# Patient Record
Sex: MALE | Race: WHITE | NOT HISPANIC OR LATINO | URBAN - METROPOLITAN AREA
[De-identification: names, ages, dates, MRNs, and addresses within clinical notes are randomized per-mention and may not be internally consistent; named-entity substitution may affect disease eponyms.]

---

## 2017-01-25 ENCOUNTER — INPATIENT (INPATIENT)
Facility: HOSPITAL | Age: 82
LOS: 2 days | Discharge: HOME | DRG: 300 | End: 2017-01-28
Attending: HOSPITALIST | Admitting: HOSPITALIST
Payer: MEDICARE

## 2017-01-25 VITALS
HEART RATE: 98 BPM | RESPIRATION RATE: 18 BRPM | TEMPERATURE: 98 F | OXYGEN SATURATION: 96 % | SYSTOLIC BLOOD PRESSURE: 129 MMHG | DIASTOLIC BLOOD PRESSURE: 72 MMHG

## 2017-01-25 DIAGNOSIS — Z41.8 ENCOUNTER FOR OTHER PROCEDURES FOR PURPOSES OTHER THAN REMEDYING HEALTH STATE: ICD-10-CM

## 2017-01-25 DIAGNOSIS — Z95.2 PRESENCE OF PROSTHETIC HEART VALVE: ICD-10-CM
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
## 2017-01-25 DIAGNOSIS — R62.7 ADULT FAILURE TO THRIVE: ICD-10-CM

## 2017-01-25 DIAGNOSIS — L97.312 NON-PRESSURE CHRONIC ULCER OF RIGHT ANKLE WITH FAT LAYER EXPOSED: ICD-10-CM

## 2017-01-25 LAB
ALBUMIN SERPL ELPH-MCNC: 2.7 G/DL — LOW (ref 3.4–5)
ALP SERPL-CCNC: 91 U/L — SIGNIFICANT CHANGE UP (ref 40–120)
ALT FLD-CCNC: 14 U/L — SIGNIFICANT CHANGE UP (ref 12–42)
ANION GAP SERPL CALC-SCNC: 9 MMOL/L — SIGNIFICANT CHANGE UP (ref 9–16)
APPEARANCE UR: CLEAR — SIGNIFICANT CHANGE UP
AST SERPL-CCNC: 38 U/L — HIGH (ref 15–37)
BILIRUB SERPL-MCNC: 1.1 MG/DL — SIGNIFICANT CHANGE UP (ref 0.2–1.2)
BILIRUB UR-MCNC: NEGATIVE — SIGNIFICANT CHANGE UP
BUN SERPL-MCNC: 23 MG/DL — SIGNIFICANT CHANGE UP (ref 7–23)
CALCIUM SERPL-MCNC: 8.6 MG/DL — SIGNIFICANT CHANGE UP (ref 8.5–10.5)
CHLORIDE SERPL-SCNC: 100 MMOL/L — SIGNIFICANT CHANGE UP (ref 96–108)
CK SERPL-CCNC: 253 U/L — SIGNIFICANT CHANGE UP (ref 39–308)
CO2 SERPL-SCNC: 28 MMOL/L — SIGNIFICANT CHANGE UP (ref 22–31)
COLOR SPEC: YELLOW — SIGNIFICANT CHANGE UP
CREAT SERPL-MCNC: 1.03 MG/DL — SIGNIFICANT CHANGE UP (ref 0.5–1.3)
DIFF PNL FLD: (no result)
GLUCOSE SERPL-MCNC: 149 MG/DL — HIGH (ref 70–99)
GLUCOSE UR QL: NEGATIVE — SIGNIFICANT CHANGE UP
HCT VFR BLD CALC: 33.7 % — LOW (ref 39–50)
HGB BLD-MCNC: 11.7 G/DL — LOW (ref 13–17)
KETONES UR-MCNC: NEGATIVE — SIGNIFICANT CHANGE UP
LEUKOCYTE ESTERASE UR-ACNC: NEGATIVE — SIGNIFICANT CHANGE UP
MCHC RBC-ENTMCNC: 33.1 PG — SIGNIFICANT CHANGE UP (ref 27–34)
MCHC RBC-ENTMCNC: 34.7 G/DL — SIGNIFICANT CHANGE UP (ref 32–36)
MCV RBC AUTO: 95.5 FL — SIGNIFICANT CHANGE UP (ref 80–100)
NITRITE UR-MCNC: NEGATIVE — SIGNIFICANT CHANGE UP
PH UR: 6 — SIGNIFICANT CHANGE UP (ref 4–8)
PLATELET # BLD AUTO: 209 K/UL — SIGNIFICANT CHANGE UP (ref 150–400)
POTASSIUM SERPL-MCNC: 5 MMOL/L — SIGNIFICANT CHANGE UP (ref 3.5–5.3)
POTASSIUM SERPL-SCNC: 5 MMOL/L — SIGNIFICANT CHANGE UP (ref 3.5–5.3)
PROT SERPL-MCNC: 7.1 G/DL — SIGNIFICANT CHANGE UP (ref 6.4–8.2)
PROT UR-MCNC: (no result) MG/DL
RBC # BLD: 3.53 M/UL — LOW (ref 4.2–5.8)
RBC # FLD: 12.4 % — SIGNIFICANT CHANGE UP (ref 10.3–16.9)
SODIUM SERPL-SCNC: 137 MMOL/L — SIGNIFICANT CHANGE UP (ref 135–145)
SP GR SPEC: 1.01 — SIGNIFICANT CHANGE UP (ref 1–1.03)
UROBILINOGEN FLD QL: 0.2 E.U./DL — SIGNIFICANT CHANGE UP
WBC # BLD: 8.1 K/UL — SIGNIFICANT CHANGE UP (ref 3.8–10.5)
WBC # FLD AUTO: 8.1 K/UL — SIGNIFICANT CHANGE UP (ref 3.8–10.5)

## 2017-01-25 PROCEDURE — 99285 EMERGENCY DEPT VISIT HI MDM: CPT | Mod: 25

## 2017-01-25 PROCEDURE — 71010: CPT | Mod: 26

## 2017-01-25 PROCEDURE — 73610 X-RAY EXAM OF ANKLE: CPT | Mod: 26,RT

## 2017-01-25 PROCEDURE — 73600 X-RAY EXAM OF ANKLE: CPT | Mod: 26,RT

## 2017-01-25 PROCEDURE — 99223 1ST HOSP IP/OBS HIGH 75: CPT | Mod: GC

## 2017-01-25 PROCEDURE — 73620 X-RAY EXAM OF FOOT: CPT | Mod: 26,RT

## 2017-01-25 RX ORDER — SODIUM CHLORIDE 9 MG/ML
1000 INJECTION INTRAMUSCULAR; INTRAVENOUS; SUBCUTANEOUS
Qty: 0 | Refills: 0 | Status: DISCONTINUED | OUTPATIENT
Start: 2017-01-25 | End: 2017-01-25

## 2017-01-25 RX ORDER — VANCOMYCIN HCL 1 G
1000 VIAL (EA) INTRAVENOUS ONCE
Qty: 0 | Refills: 0 | Status: COMPLETED | OUTPATIENT
Start: 2017-01-25 | End: 2017-01-25

## 2017-01-25 RX ORDER — HEPARIN SODIUM 5000 [USP'U]/ML
5000 INJECTION INTRAVENOUS; SUBCUTANEOUS EVERY 8 HOURS
Qty: 0 | Refills: 0 | Status: DISCONTINUED | OUTPATIENT
Start: 2017-01-25 | End: 2017-01-28

## 2017-01-25 RX ORDER — PIPERACILLIN AND TAZOBACTAM 4; .5 G/20ML; G/20ML
3.38 INJECTION, POWDER, LYOPHILIZED, FOR SOLUTION INTRAVENOUS ONCE
Qty: 0 | Refills: 0 | Status: COMPLETED | OUTPATIENT
Start: 2017-01-25 | End: 2017-01-25

## 2017-01-25 RX ORDER — SODIUM CHLORIDE 9 MG/ML
1000 INJECTION INTRAMUSCULAR; INTRAVENOUS; SUBCUTANEOUS
Qty: 0 | Refills: 0 | Status: DISCONTINUED | OUTPATIENT
Start: 2017-01-25 | End: 2017-01-26

## 2017-01-25 RX ADMIN — PIPERACILLIN AND TAZOBACTAM 200 GRAM(S): 4; .5 INJECTION, POWDER, LYOPHILIZED, FOR SOLUTION INTRAVENOUS at 16:05

## 2017-01-25 RX ADMIN — Medication 250 MILLIGRAM(S): at 17:10
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
## 2017-01-25 NOTE — ED PROVIDER NOTE - OBJECTIVE STATEMENT
89y male h/o LLE cellulitis, heart valve replacement, has not seen MD in many years, brought in by EMS after building super found him on the floor in his apt. Pt states that he lost his balance while reaching for something today and fell. Denies hitting his head or LOC. EMS reported to staff that apt was very dirty. Pt denies any current complaints, but appears unkempt. 89y male h/o LLE cellulitis, heart valve replacement, has not seen MD in many years, brought in by EMS after building super found him on the floor in his apt. Pt states that he lost his balance while reaching for something today and fell. Denies hitting his head or LOC. EMS reported to staff that apt was very dirty. Pt denies any current complaints, but appears unkempt. States he has had an ulcer on his right ankle for 40 years.

## 2017-01-25 NOTE — ED PROVIDER NOTE - MUSCULOSKELETAL, MLM
no cspine bony ttp/stepoff. 5x6cm open wound over left medial malleolus with foul-odor and maggots present. significant swelling to left foot/ankle, warm and well-perfused.

## 2017-01-25 NOTE — H&P ADULT. - PROBLEM SELECTOR PLAN 3
HSQ  Regular Diet  Replete Miladis PRN  Dispo: Pending PT recommendations, admit to medicine for now  Full Code Patient with hx heart valve replacement many years prior. Denies hx of CAD, CHF on no medications.   -Would check EKG  -Consider checking echocardiogram in AM Patient with hx heart valve replacement many years prior. Denies hx of CAD, CHF on no medications.   -Would check EKG  -Echocardiogram

## 2017-01-25 NOTE — ED ADULT NURSE REASSESSMENT NOTE - NS ED NURSE REASSESS COMMENT FT1
Upon further evaluation of RLE wound with MD Restrepo pt found to have maggots in wound. Pt moved to private room 14, wound cleaned with MD Restrepo. Pt pending lab work and XR for further evaluation. Transferred care for 45 min break coverage to [facility]arg.

## 2017-01-25 NOTE — ED ADULT NURSE REASSESSMENT NOTE - NS ED NURSE REASSESS COMMENT FT1
After conversations with Alessandro from  pt amenable to abx and admission. Zosyn hanging as ordered, pt pending sign out. Will continue to monitor.
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
## 2017-01-25 NOTE — ED ADULT TRIAGE NOTE - CHIEF COMPLAINT QUOTE
BIBA s/p un-wittnessed fall. Patients super found patient on the floor. Denies hitting head, LOC, neck pain, patient stated he tripped and fell. +Wound to right shin, not bleeding, serosanguinous drainage and redness noted. Patient lives at home by self. Denies HA, numbness or tingling to extremities, fever/chills, N/V/D, CP, SOB. Patient is A&O x3.

## 2017-01-25 NOTE — H&P ADULT. - CONSTITUTIONAL COMMENTS
Unkempt appearance, in NAD, conversational. Unkempt appearance, in NAD, conversational. Head atraumatic, no areas of tenderness.

## 2017-01-25 NOTE — H&P ADULT. - EYES
detailed exam EOMI Left eye non-reactive to light/minimally reactive, Right eye reactive to light/EOMI

## 2017-01-25 NOTE — ED ADULT NURSE REASSESSMENT NOTE - NS ED NURSE REASSESS COMMENT FT1
Per discussion with ANMs and bed board pt does not need to be admitted on contact precautions. MD Restrepo aware. Pt pending bed assignment, to be flipped to holding. Will continue to monitor.

## 2017-01-25 NOTE — ED PROVIDER NOTE - MEDICAL DECISION MAKING DETAILS
89y male found on floor by super after mechanical fall. Living alone, not seeing doctor. Has open wound with maggots present at left ankle. Not able to properly care for self. Will check labs, CXR, UA, xray L ankle/foot for air/osteo. Will require admission for  and wound care. 89y male found on floor by super after mechanical fall. Living alone, not seeing doctor. Has open wound with maggots present at left ankle (wound cleaned, maggots removed, wrapped). Not able to properly care for self. Will check labs, CXR, UA, xray L ankle/foot for air/osteo. Will require admission for  and wound care.

## 2017-01-25 NOTE — ED PROVIDER NOTE - PROGRESS NOTE DETAILS
Pt refusing antibiotics and refusing to be admitted. Multiple staff members attempted to speak with patient about this. SW now at bedside. Pt refusing antibiotics and refusing to be admitted. Multiple staff members attempted to speak with patient about this including MD, RN, case mgmt. Pt amenable for admission, antibiotics.

## 2017-01-25 NOTE — H&P ADULT. - PROBLEM SELECTOR PLAN 1
Patient presents with fall, likely mechanical; patient with unkempt appearance and found to have maggots in ulcer on arrival. No sob, chest pain, neurological sequelae suggesting cardiac event, or seizure like activity. Patient likely with chronic deconditioning and has not seen physician in several years.   -PT/OT consult in AM  -F/U social work for palement Patient presents with fall, likely mechanical; patient with unkempt appearance and found to have maggots in ulcer on arrival. No sob, chest pain, neurological sequelae suggesting cardiac event, or seizure like activity. Patient likely with chronic deconditioning and has not seen physician in several years. Patient agreeing to be admitted because he does not have a way to go home currently.   -PT/OT consult in AM  -F/U social work for placement Patient presents with fall, likely mechanical; patient with unkempt appearance and found to have maggots in ulcer on arrival. No sob, chest pain, neurological sequelae suggesting cardiac event, or seizure like activity. Patient likely with chronic deconditioning and has not seen physician in several years. Patient agreeing to be admitted because he does not have a way to go home currently.   -PT/OT consult in AM  -F/U social work for placement  -Patient without Physician for care management, will likely need to be plugged into [facility] for follow up. Patient presents with fall, likely mechanical as patient with sig. r.knee osteoarthritis and frequently leans on left leg; patient with unkempt appearance and found to have maggots in ulcer on arrival. No sob, chest pain, neurological sequelae suggesting cardiac event, or seizure like activity. Patient likely with chronic deconditioning and has not seen physician in several years. Patient agreeing to be admitted because he does not have a way to go home currently.   -Will add-on  CK as patient on floor for hours per him and will start on gentle hydration with NS at 80cc/hr  -PT/OT consult in AM  -F/U social work for placement  -Patient without Physician for care management, will likely need to be plugged into [facility] for follow up  - Patient presents with fall, likely mechanical as patient with sig. r.knee osteoarthritis and frequently leans on left leg; patient with unkempt appearance and found to have maggots in ulcer on arrival. No sob, chest pain, neurological sequelae suggesting cardiac event, or seizure like activity. Patient likely with chronic deconditioning and has not seen physician in several years. Patient agreeing to be admitted because he does not have a way to go home currently.   -Will add-on  CK as patient on floor for hours per him and will start on gentle hydration with NS at 80cc/hr  -PT/OT consult in AM  -F/U social work for placement  -Patient without Physician for care management, will likely need to be plugged into [facility] for follow up  -check EKG

## 2017-01-25 NOTE — ED ADULT NURSE REASSESSMENT NOTE - NS ED NURSE REASSESS COMMENT FT1
Pt refusing abx, states "I want to go home the leg will heal on its own" CM at bedside for evaluation, MD Restrepo aware. Hold abx for now. Will continue to monitor.

## 2017-01-25 NOTE — H&P ADULT. - PROBLEM SELECTOR PLAN 4
HSQ  Regular Diet  Replete Miladis PRN  Dispo: Pending PT recommendations, admit to medicine for now  Full Code

## 2017-01-25 NOTE — H&P ADULT. - EXTREMITIES COMMENTS
Chronic Right medial malleolus 4x5cm ulcer with surrounding edema of RLE. Wound has been cleaned and bandaged by ED team. Patient with some TTP of leg. Noticeable assymetry between Right and left lower extremities, non-pitting edema on RLE. Strenght 4-4+ on bilaterally uppler extremities; 4-4+ on bilateral lower extremities. Assymetric size of R.knee and L.Knee, right knee non painful to palpation, no fluctuance or erythema overlying area.

## 2017-01-25 NOTE — ED PROVIDER NOTE - CARE PLAN
Principal Discharge DX:	Skin ulcer of right ankle with fat layer exposed  Secondary Diagnosis:	Failure to thrive in adult

## 2017-01-25 NOTE — H&P ADULT. - HISTORY OF PRESENT ILLNESS
89 yo male pmh osteoarthritis, chronic   BIBEMS after his building super entered the apartment and called EMS ([address]) after he was found to have fallen by his bedside. Patient states that this AM he awoke normally, was using his walker (states he can walk without it, but uses it "just in case") to go back to his bed after having coffee and felt like he could no move any further and felt like his legs gave out, subsequently falling. States that prior to the fall, he did not have any vision changes, nausea, vomiting, diaphoresis and did not have bowel/bladder incontinence. He did not lose consciousness and was mentating after he fell. He denied chest pain, shortness of breath. Has not had recent sick contacts, recent travel, or infections.     Of note, patient is a poor historian, going back and forth between answers before settling on a specific one. He has a chronic right malleolus ulcer with surrounding edematous RLE with erythema; he states that it usually swells up and goes away on its own; per the ED note/signout, on initial presentation he was noted to have maggots in the ulcer which have since been cleaned out with the ulcer bandaged.     In the ED his vitals were: HR 98 /72  T98.4 RR 18 O296 on room air. He was given 1x doses of Vancomycin 1gram and Zosyn 3.375 grams. 89 yo male pmh osteoarthritis, chronic   BIBEMS after his building super entered the apartment  and called EMS ([address]) after he was found to have fallen by his bedside and was on the ground for hours. Patient states that this AM he awoke normally, was using his walker (states he can walk without it, but uses it "just in case") to go back to his bed after having coffee and felt like he could no move any further and felt like his legs gave out, subsequently falling. States that prior to the fall, he did not have any vision changes, nausea, vomiting, diaphoresis and did not have bowel/bladder incontinence. He did not lose consciousness and was mentating after he fell. He denied chest pain, shortness of breath. Has not had recent sick contacts, recent travel, or infections.     Of note, patient is a poor historian, going back and forth between answers before settling on a specific one. He has a chronic right malleolus ulcer with surrounding edematous RLE with erythema; he states that it usually swells up and goes away on its own; per the ED note/signout, on initial presentation he was noted to have maggots in the ulcer which have since been cleaned out with the ulcer bandaged.     In the ED his vitals were: HR 98 /72  T98.4 RR 18 O296 on room air. He was given 1x doses of Vancomycin 1gram and Zosyn 3.375 grams. 89 yo male pmh osteoarthritis, chronic   BIBEMS after his building super entered the apartment  and called EMS ([address]) after he was found to have fallen by his bedside and was on the ground for hours. Patient states that this AM he awoke normally, was using his walker (states he can walk without it, but uses it "just in case") to go back to his bed after having coffee and felt like he could no move any further and felt like his legs gave out, subsequently falling. States that prior to the fall, he did not have any vision changes, nausea, vomiting, diaphoresis and did not have bowel/bladder incontinence. He did not lose consciousness and was mentating after he fell. He denied chest pain, shortness of breath. Has not had recent sick contacts, recent travel, or infections. Patient denies hx of CAD, CHF, seizures in the past.     Of note, patient is a poor historian, going back and forth between answers before settling on a specific one. He has a chronic right malleolus ulcer with surrounding edematous RLE with erythema; he states that it usually swells up and goes away on its own; per the ED note/signout, on initial presentation he was noted to have maggots in the ulcer which have since been cleaned out with the ulcer bandaged.     In the ED his vitals were: HR 98 /72  T98.4 RR 18 O296 on room air. He was given 1x doses of Vancomycin 1gram and Zosyn 3.375 grams.

## 2017-01-25 NOTE — H&P ADULT. - ASSESSMENT
89 year old male (hx valve replacement, osteoarthritis) presents s/p fall (without loss of consciousness )at home found to have chronic right malleolar ulcer with X-ray ankle showing:  Skin ulceration along the medial aspect of the ankle with chronic appearing periosteal reaction the underlying tibia. Osteomyelitis cannot be excluded.

## 2017-01-25 NOTE — ED PROVIDER NOTE - CONSTITUTIONAL, MLM
normal... awake, alert, oriented to person, place, time/situation and in no apparent distress. disheveled, unkempt.

## 2017-01-25 NOTE — H&P ADULT. - RS GEN PE MLT RESP DETAILS PC
no intercostal retractions/no chest wall tenderness/airway patent/respirations non-labored/clear to auscultation bilaterally/good air movement/breath sounds equal/no wheezes/no rhonchi/no rales

## 2017-01-25 NOTE — H&P ADULT. - PROBLEM SELECTOR PLAN 2
Patient without elevated WBC, leukocytosis, fever, or hypotension; suspicion for infection is low at this time; patient mentating well.  S/p 1x of vancomycin and zosyn in ER.   -Wound Care consult in AM  - Patient without elevated WBC, leukocytosis, fever, or hypotension; suspicion for infection is low at this time; patient mentating well.  S/p 1x of vancomycin and zosyn in ER. +pulses present bilateral lower extremities.   -Wound Care consult in AM  -Consider vascular consult for further management/recommendations. Patient without elevated WBC, leukocytosis, fever, or hypotension; suspicion for infection is low at this time; patient mentating well.   S/p 1x of vancomycin and zosyn in ER. +pulses present bilateral lower extremities. Likely chronic venous stasis ulcer consistent with chronic periosteal reaction seen on X-ray of right ankle.    -Wound Care consult in AM  -ESR/CRP  -Vascular consult in AM, compression and leg elevation  -LE doppler to rule out DVT  -MRI for evaluation for further evaluation Patient without elevated WBC, leukocytosis, fever, or hypotension; suspicion for infection is low at this time; patient mentating well.   S/p 1x of vancomycin and zosyn in ER. +pulses present bilateral lower extremities. Likely chronic venous stasis ulcer consistent with chronic periosteal reaction seen on X-ray of right ankle.    -Wound Care consult in AM  -Podiatry consult in AM, compression and leg elevation  - Would hold off on antibiotics for now; check ESR/CRP  -MRI for evaluation for further evaluation

## 2017-01-26 LAB
ANION GAP SERPL CALC-SCNC: 5 MMOL/L — LOW (ref 9–16)
BUN SERPL-MCNC: 20 MG/DL — SIGNIFICANT CHANGE UP (ref 7–23)
CALCIUM SERPL-MCNC: 8 MG/DL — LOW (ref 8.5–10.5)
CHLORIDE SERPL-SCNC: 103 MMOL/L — SIGNIFICANT CHANGE UP (ref 96–108)
CO2 SERPL-SCNC: 31 MMOL/L — SIGNIFICANT CHANGE UP (ref 22–31)
CREAT SERPL-MCNC: 1.01 MG/DL — SIGNIFICANT CHANGE UP (ref 0.5–1.3)
CRP SERPL-MCNC: 10.7 MG/DL — HIGH
ERYTHROCYTE [SEDIMENTATION RATE] IN BLOOD: 46 MM/HR — HIGH
GLUCOSE SERPL-MCNC: 87 MG/DL — SIGNIFICANT CHANGE UP (ref 70–99)
HCT VFR BLD CALC: 33.1 % — LOW (ref 39–50)
HGB BLD-MCNC: 11.3 G/DL — LOW (ref 13–17)
MAGNESIUM SERPL-MCNC: 1.8 MG/DL — SIGNIFICANT CHANGE UP (ref 1.6–2.4)
MCHC RBC-ENTMCNC: 32.5 PG — SIGNIFICANT CHANGE UP (ref 27–34)
MCHC RBC-ENTMCNC: 34.1 G/DL — SIGNIFICANT CHANGE UP (ref 32–36)
MCV RBC AUTO: 95.1 FL — SIGNIFICANT CHANGE UP (ref 80–100)
PHOSPHATE SERPL-MCNC: 2.7 MG/DL — SIGNIFICANT CHANGE UP (ref 2.5–4.5)
PLATELET # BLD AUTO: 206 K/UL — SIGNIFICANT CHANGE UP (ref 150–400)
POTASSIUM SERPL-MCNC: 3.8 MMOL/L — SIGNIFICANT CHANGE UP (ref 3.5–5.3)
POTASSIUM SERPL-SCNC: 3.8 MMOL/L — SIGNIFICANT CHANGE UP (ref 3.5–5.3)
RBC # BLD: 3.48 M/UL — LOW (ref 4.2–5.8)
RBC # FLD: 12.4 % — SIGNIFICANT CHANGE UP (ref 10.3–16.9)
SODIUM SERPL-SCNC: 139 MMOL/L — SIGNIFICANT CHANGE UP (ref 135–145)
WBC # BLD: 5.6 K/UL — SIGNIFICANT CHANGE UP (ref 3.8–10.5)
WBC # FLD AUTO: 5.6 K/UL — SIGNIFICANT CHANGE UP (ref 3.8–10.5)

## 2017-01-26 PROCEDURE — 99232 SBSQ HOSP IP/OBS MODERATE 35: CPT

## 2017-01-26 PROCEDURE — 93306 TTE W/DOPPLER COMPLETE: CPT | Mod: 26

## 2017-01-26 PROCEDURE — 93970 EXTREMITY STUDY: CPT | Mod: 26

## 2017-01-26 PROCEDURE — 93010 ELECTROCARDIOGRAM REPORT: CPT

## 2017-01-26 RX ADMIN — HEPARIN SODIUM 5000 UNIT(S): 5000 INJECTION INTRAVENOUS; SUBCUTANEOUS at 21:51

## 2017-01-26 NOTE — PROGRESS NOTE ADULT - PROBLEM SELECTOR PLAN 3
Patient with hx heart valve replacement many years prior. Denies hx of CAD, CHF on no medications.   -Would check EKG  -Echocardiogram

## 2017-01-26 NOTE — PHYSICAL THERAPY INITIAL EVALUATION ADULT - CRITERIA FOR SKILLED THERAPEUTIC INTERVENTIONS
functional limitations in following categories/impairments found/anticipated discharge recommendation/rehab potential
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
## 2017-01-26 NOTE — PROGRESS NOTE ADULT - PROBLEM SELECTOR PLAN 1
Patient presents with fall, likely mechanical as patient with sig. r.knee osteoarthritis and frequently leans on left leg; patient with unkempt appearance and found to have maggots in ulcer on arrival. No sob, chest pain, neurological sequelae suggesting cardiac event, or seizure like activity. Patient likely with chronic deconditioning and has not seen physician in several years. Patient agreeing to be admitted because he does not have a way to go home currently.   -Will add-on  CK as patient on floor for hours per him and will start on gentle hydration with NS at 80cc/hr  -PT/OT consult in AM  -F/U social work for placement  -Patient without Physician for care management, will likely need to be plugged into [facility] for follow up  -check EKG (patient refused during night, will try again this AM)

## 2017-01-26 NOTE — PHYSICAL THERAPY INITIAL EVALUATION ADULT - PERTINENT HX OF CURRENT PROBLEM, REHAB EVAL
89 year old male BIBA after he was found to have fallen by his bedside and was on the ground for hours. Patient states that this AM he awoke normally, was using his walker (states he can walk without it, but uses it "just in case") to go back to his bed after having coffee and felt like he could no move any further and felt like his legs gave out, subsequently falling. Patient with RLE ulcer, found to have maggots in it.

## 2017-01-26 NOTE — PHYSICAL THERAPY INITIAL EVALUATION ADULT - GENERAL OBSERVATIONS, REHAB EVAL
Patient received in bed in no acute distress +dressing right ankle, ace wrap right wrist. 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

## 2017-01-26 NOTE — ADVANCED PRACTICE NURSE CONSULT - RECOMMEDATIONS
After cleansing, apply Aquacel AG every other day into wound bed, cover with dry dressing. Spoke with pt about cleansing wound, as he had not been doing so. Also spoke with LV White and house staff.

## 2017-01-26 NOTE — CHART NOTE - NSCHARTNOTEFT_GEN_A_CORE
PGY-1 Event Note    Patient's EKG that was ordered was not performed prior to him being moved to the floor. Went to perform the EKG myself, patient refusing said EKG because he feels he does not need it and believes in alternative medicine, also does not want to get stuck with a needle again. Patient also refusing IV hydration and antibiotics. Explained to the patient that the EKG would not involve any needle sticks and was to look for a heart problem that may be causing his falls. Patient still refusing, appears to have capacity. EKG re-ordered for tomorrow.

## 2017-01-26 NOTE — PROGRESS NOTE ADULT - PROBLEM SELECTOR PLAN 2
Patient without elevated WBC, leukocytosis, fever, or hypotension; suspicion for infection is low at this time; patient mentating well.   S/p 1x of vancomycin and zosyn in ER. +pulses present bilateral lower extremities. Likely chronic venous stasis ulcer consistent with chronic periosteal reaction seen on X-ray of right ankle.    -Wound Care consult    -Podiatry consult in AM, compression and leg elevation  - Would hold off on antibiotics for now though patient refusing; ESR/CRP elevated  -MRI for evaluation for further evaluation

## 2017-01-26 NOTE — PHYSICAL THERAPY INITIAL EVALUATION ADULT - ADDITIONAL COMMENTS
Patient lives in elevator building but has 28 steps to negotiate to get to elevator. States he carries groceries up and down steps along with rollator.

## 2017-01-27 ENCOUNTER — TRANSCRIPTION ENCOUNTER (OUTPATIENT)
Age: 82
End: 2017-01-27

## 2017-01-27 DIAGNOSIS — M19.90 UNSPECIFIED OSTEOARTHRITIS, UNSPECIFIED SITE: ICD-10-CM

## 2017-01-27 DIAGNOSIS — I83.013 VARICOSE VEINS OF RIGHT LOWER EXTREMITY WITH ULCER OF ANKLE: ICD-10-CM

## 2017-01-27 DIAGNOSIS — I50.22 CHRONIC SYSTOLIC (CONGESTIVE) HEART FAILURE: ICD-10-CM

## 2017-01-27 PROCEDURE — 81001 URINALYSIS AUTO W/SCOPE: CPT

## 2017-01-27 PROCEDURE — 73600 X-RAY EXAM OF ANKLE: CPT

## 2017-01-27 PROCEDURE — 81003 URINALYSIS AUTO W/O SCOPE: CPT

## 2017-01-27 PROCEDURE — 85652 RBC SED RATE AUTOMATED: CPT

## 2017-01-27 PROCEDURE — 93970 EXTREMITY STUDY: CPT

## 2017-01-27 PROCEDURE — 86140 C-REACTIVE PROTEIN: CPT

## 2017-01-27 PROCEDURE — 97161 PT EVAL LOW COMPLEX 20 MIN: CPT

## 2017-01-27 PROCEDURE — 82550 ASSAY OF CK (CPK): CPT

## 2017-01-27 PROCEDURE — 36415 COLL VENOUS BLD VENIPUNCTURE: CPT

## 2017-01-27 PROCEDURE — 96374 THER/PROPH/DIAG INJ IV PUSH: CPT

## 2017-01-27 PROCEDURE — 99285 EMERGENCY DEPT VISIT HI MDM: CPT | Mod: 25

## 2017-01-27 PROCEDURE — 99232 SBSQ HOSP IP/OBS MODERATE 35: CPT

## 2017-01-27 PROCEDURE — 71045 X-RAY EXAM CHEST 1 VIEW: CPT

## 2017-01-27 PROCEDURE — 73620 X-RAY EXAM OF FOOT: CPT

## 2017-01-27 PROCEDURE — 85027 COMPLETE CBC AUTOMATED: CPT

## 2017-01-27 PROCEDURE — 80048 BASIC METABOLIC PNL TOTAL CA: CPT

## 2017-01-27 PROCEDURE — 83735 ASSAY OF MAGNESIUM: CPT

## 2017-01-27 PROCEDURE — 93005 ELECTROCARDIOGRAM TRACING: CPT

## 2017-01-27 PROCEDURE — 80053 COMPREHEN METABOLIC PANEL: CPT

## 2017-01-27 PROCEDURE — 84100 ASSAY OF PHOSPHORUS: CPT

## 2017-01-27 PROCEDURE — 93306 TTE W/DOPPLER COMPLETE: CPT

## 2017-01-27 RX ORDER — ACETAMINOPHEN 500 MG
650 TABLET ORAL EVERY 6 HOURS
Qty: 0 | Refills: 0 | Status: DISCONTINUED | OUTPATIENT
Start: 2017-01-27 | End: 2017-01-28

## 2017-01-27 NOTE — DISCHARGE NOTE ADULT - CARE PROVIDERS DIRECT ADDRESSES
,[email or web address]

## 2017-01-27 NOTE — DISCHARGE NOTE ADULT - CARE PLAN
Principal Discharge DX:	Venous ulcer of ankle, right  Goal:	Proper wound care of venous ulcer  Instructions for follow-up, activity and diet:	You have had a chronic ulcer for decades per discussions since your were admitted to [facility].    Apply Aquacel AG every other day into wound bed, cover with dry dressing.  Secondary Diagnosis:	Chronic systolic CHF (congestive heart failure)  Instructions for follow-up, activity and diet:	You had an imaging study of the heart which showed decreased function with an Ejection fraction of 35%. Please follow up with Principal Discharge DX:	Venous ulcer of ankle, right  Goal:	Proper wound care of venous ulcer  Instructions for follow-up, activity and diet:	You have had a chronic ulcer for decades per discussions since your were admitted to [facility].    Apply Aquacel AG every other day into wound bed, cover with dry dressing.  Secondary Diagnosis:	Chronic systolic CHF (congestive heart failure)  Instructions for follow-up, activity and diet:	You had an imaging study of the heart which showed decreased function with an Ejection fraction of 35%. Please follow up with Principal Discharge DX:	Venous ulcer of ankle, right  Goal:	Proper wound care of venous ulcer  Instructions for follow-up, activity and diet:	You have had a chronic ulcer for decades per discussions since your were admitted to [facility].    Apply Aquacel AG every other day into wound bed, cover with dry dressing. Also, please call [facility] to make an appointment for post hospital discharge [phone]; they will likely call you to follow up. If you feel like you are about to faint, feel like you have a fever, or notice the wound is draining purulent fluid, and/or your leg starts to swell further please return to the ER for treatment.  Secondary Diagnosis:	Chronic systolic CHF (congestive heart failure)  Instructions for follow-up, activity and diet:	You had an imaging study of the heart which showed decreased function with an Ejection fraction of 35%. Please follow up with Dr. Tristan, who's number is listed below; you had no signs of fluid overload (fluid in lungs) and had no SOB on discharge. Principal Discharge DX:	Venous ulcer of ankle, right  Goal:	Proper wound care of venous ulcer  Instructions for follow-up, activity and diet:	You have had a chronic ulcer for decades per discussions since your were admitted to [facility].    Apply Aquacel AG every other day into wound bed, cover with dry dressing. Also, please call [facility] to make an appointment for post hospital discharge [phone]; they will likely call you to follow up. If you feel like you are about to faint, feel like you have a fever, or notice the wound is draining purulent fluid, and/or your leg starts to swell further please return to the ER for treatment.  Secondary Diagnosis:	Chronic systolic CHF (congestive heart failure)  Instructions for follow-up, activity and diet:	You had an imaging study of the heart which showed decreased function with an Ejection fraction of 35%. Please follow up with Dr. Tristan, who's number is listed below; you had no signs of fluid overload (fluid in lungs) and had no SOB on discharge. Principal Discharge DX:	Venous ulcer of ankle, right  Goal:	Proper wound care of venous ulcer  Instructions for follow-up, activity and diet:	You have had a chronic ulcer for decades per discussions since your were admitted to [facility].    Apply Aquacel AG every other day into wound bed, cover with dry dressing. Also, please call [facility] to make an appointment for post hospital discharge [phone]; they will likely call you to follow up. If you feel like you are about to faint, feel like you have a fever, or notice the wound is draining purulent fluid, and/or your leg starts to swell further please return to the ER for treatment.  Secondary Diagnosis:	Chronic systolic CHF (congestive heart failure)  Instructions for follow-up, activity and diet:	You had an imaging study of the heart which showed decreased function with an Ejection fraction of 35%. Please follow up with Dr. Tristan, who's number is listed below; you had no signs of fluid overload (fluid in lungs) and had no SOB on discharge.

## 2017-01-27 NOTE — DISCHARGE NOTE ADULT - HOSPITAL COURSE
89 yo male pmh osteoarthritis, chronic   BIBEMS after his building super entered the apartment  and called EMS ([address]) after he was found to have fallen by his bedside and was on the ground for hours. This was likely a mechanical fall. Patient also with RLE venous ulcer 4.x6 cm with xray showing per-osteal chronicity. Podiatry evaluated and low suspicion for infection. Wound care evaluated patient and recommended: After cleansing, apply Aquacel AG every other day into wound bed, cover with dry dressing. Patient with EKG showing 1st degree block. Echo showed EF 35% with moderate global hypokinesia of left ventricle. Patient without signs of fluid overload. Patient refused antibiotics, lab draws towards end of hospital stay. Did not wish to go to [facility] and would rather go home.
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
## 2017-01-27 NOTE — CONSULT NOTE ADULT - PROBLEM SELECTOR RECOMMENDATION 9
-Agree with wound care plan  -Aquacel G applied with DSD  -Not concerned for Acute OM given that this is a chronic ulceration and his WBC is 5.6, and patient does not display systemic symptoms of infection  -Continue with local wound care  -WBAT  -Can re-consult if condition worsens

## 2017-01-27 NOTE — PROGRESS NOTE ADULT - PROBLEM SELECTOR PROBLEM 3
History of heart valve replacement
History of heart valve replacement
Chronic systolic CHF (congestive heart failure)

## 2017-01-27 NOTE — PROGRESS NOTE ADULT - PROBLEM SELECTOR PLAN 4
HSQ  Regular Diet  Replete Miladis PRN  Dispo: Pending PT recommendations, admit to medicine for now  Full Code HSQ  Regular Diet  Replete Lytes PRN  Dispo: Home with Home PT (physical therapy); follow up social work.   Full Code HSQ  Regular Diet  Replete Lytes PRN  Dispo: Home with Home PT (physical therapy); follow up social work pending safe discharge.   Full Code

## 2017-01-27 NOTE — PROGRESS NOTE ADULT - PROBLEM SELECTOR PLAN 3
Patient with hx heart valve replacement many years prior. Denies hx of CAD, CHF on no medications.   -Would check EKG  -Echocardiogram Patient with hx heart valve replacement many years prior. Denies hx of CAD, CHF on no medications. EKG showing 1st degree block with atrial premature contractions.      -Echocardiogram Patient with hx heart valve replacement many years prior. Echo showing EF 35% with moderate global hypokinesis of Left Ventricle  -Will need outpatient cardiology follow up.       -Echocardiogram

## 2017-01-27 NOTE — PROGRESS NOTE ADULT - PROBLEM SELECTOR PROBLEM 2
Skin ulcer of right ankle with fat layer exposed
Skin ulcer of right ankle with fat layer exposed
Osteoarthritis

## 2017-01-27 NOTE — DISCHARGE NOTE ADULT - PLAN OF CARE
Proper wound care of venous ulcer You have had a chronic ulcer for decades per discussions since your were admitted to [facility].    Apply Aquacel AG every other day into wound bed, cover with dry dressing. You had an imaging study of the heart which showed decreased function with an Ejection fraction of 35%. Please follow up with You have had a chronic ulcer for decades per discussions since your were admitted to [facility].    Apply Aquacel AG every other day into wound bed, cover with dry dressing. Also, please call [facility] associates to make an appointment for post hospital discharge [phone]; they will likely call you to follow up. If you feel like you are about to faint, feel like you have a fever, or notice the wound is draining purulent fluid, and/or your leg starts to swell further please return to the ER for treatment. You had an imaging study of the heart which showed decreased function with an Ejection fraction of 35%. Please follow up with Dr. Tristan, who's number is listed below; you had no signs of fluid overload (fluid in lungs) and had no SOB on discharge.

## 2017-01-27 NOTE — DISCHARGE NOTE ADULT - PATIENT PORTAL LINK FT
“You can access the Follow[employer] Patient Portal, offered by [facility], by registering with the following website: [email or web address]”

## 2017-01-27 NOTE — PROGRESS NOTE ADULT - SUBJECTIVE AND OBJECTIVE BOX
Patient is a 89y old  Male who presents with a chief complaint of Fall, brought in by ems against his wishes (2017 18:05)      INTERVAL HPI/OVERNIGHT EVENTS:    Pt. feels well, no complaints  Denies R ankle pain    Review of Systems: 12 point review of systems otherwise negative    MEDICATIONS  (STANDING):  heparin  Injectable 5000Unit(s) SubCutaneous every 8 hours    MEDICATIONS  (PRN):      Allergies    iodine containing compounds (Unknown)    Intolerances          Vital Signs Last 24 Hrs  T(C): 36.1, Max: 36.8 ( @ 17:44)  T(F): 97, Max: 98.2 ( @ 17:44)  HR: 83 (76 - 83)  BP: 138/- (114/71 - 161/80)  BP(mean): --  RR: 16 (16 - 18)  SpO2: 95% (95% - 98%)  CAPILLARY BLOOD GLUCOSE        Physical Exam:    Daily     Daily   General:  Well-appearing, in NAD, sitting in chair  HEENT:  MMM  CV:  no JVD  Lungs:  CTA B/L  Extremities: chronic R medial malleolar ulcer, dressing C/D/I  Skin:  Warm and dry  Neuro:  AAOx3    LABS:                        11.3   5.6   )-----------( 206      ( 2017 06:54 )             33.1     2017 06:07    139    |  103    |  20     ----------------------------<  87     3.8     |  31     |  1.01     Ca    8.0        2017 06:07  Phos  2.7       2017 06:07  Mg     1.8       2017 06:07    TPro  7.1    /  Alb  2.7    /  TBili  1.1    /  DBili  x      /  AST  38     /  ALT  14     /  AlkPhos  91     2017 14:16      Urinalysis Basic - ( 2017 17:20 )    Color: Yellow / Appearance: Clear / S.015 / pH: x  Gluc: x / Ketone: NEGATIVE  / Bili: NEGATIVE / Urobili: 0.2 E.U./dL   Blood: x / Protein: Trace mg/dL / Nitrite: NEGATIVE   Leuk Esterase: NEGATIVE / RBC: 5-10 /HPF / WBC < 5 /HPF   Sq Epi: x / Non Sq Epi: Rare /HPF / Bacteria: Present /HPF          RADIOLOGY & ADDITIONAL TESTS:    ---------------------------------------------------------------------------  I personally reviewed: [  ]EKG   [  ]CXR    [  ] CT    [  ]Other  ---------------------------------------------------------------------------  PLEASE CHECK WHEN PRESENT:     [  ]Heart Failure     [  ] Acute     [  ] Acute on Chronic     [  ] Chronic  -------------------------------------------------------------------     [  ]Diastolic [HFpEF]     [  ]Systolic [HFrEF]     [  ]Combined [HFpEF & HFrEF]     [  ]Other:  -------------------------------------------------------------------  [  ]JAK     [  ]ATN     [  ]Reneal Medullary Necrosis     [  ]Renal Cortical Necrosis     [  ]Other Pathological Lesions:    [  ]CKD 1  [  ]CKD 2  [  ]CKD 3  [  ]CKD 4  [  ]CKD 5  [  ]Other  -------------------------------------------------------------------  [  ]Other/Unspecified:    --------------------------------------------------------------------    Abdominal Nutritional Status  [  ]Malnutrition: See Nutrition Note  [  ]Cachexia  [  ]Other:   [  ]Supplement Ordered:  [  ]Morbid Obesity (BMI >=40]

## 2017-01-27 NOTE — CONSULT NOTE ADULT - SUBJECTIVE AND OBJECTIVE BOX
Patient is a 89y old  Male who presents with a chief complaint of Fall, brought in by ems against his wishes (2017 18:05)      HPI:  89 yo male pmh osteoarthritis, chronic   BIBEMS after his building super entered the apartment  and called EMS ([place]ives on  [address]) after he was found to have fallen by his bedside and was on the ground for hours. Patient states that this AM he awoke normally, was using his walker (states he can walk without it, but uses it "just in case") to go back to his bed after having coffee and felt like he could no move any further and felt like his legs gave out, subsequently falling. States that prior to the fall, he did not have any vision changes, nausea, vomiting, diaphoresis and did not have bowel/bladder incontinence. He did not lose consciousness and was mentating after he fell. He denied chest pain, shortness of breath. Has not had recent sick contacts, recent travel, or infections. Patient denies hx of CAD, CHF, seizures in the past.     Of note, patient is a poor historian, going back and forth between answers before settling on a specific one. He has a chronic right malleolus ulcer with surrounding edematous RLE with erythema; he states that it usually swells up and goes away on its own; per the ED note/signout, on initial presentation he was noted to have maggots in the ulcer which have since been cleaned out with the ulcer bandaged.     In the ED his vitals were: HR 98 /72  T98.4 RR 18 O296 on room air. He was given 1x doses of Vancomycin 1gram and Zosyn 3.375 grams. (2017 18:05)      PAST MEDICAL & SURGICAL HISTORY:  Osteoarthritis  History of heart valve replacement: Mitral valve replaced  History of heart valve replacement: Mitral valve replaced      MEDICATIONS  (STANDING):  heparin  Injectable 5000Unit(s) SubCutaneous every 8 hours    MEDICATIONS  (PRN):      Social History:  , lives alone in an elevator accessible apartment building, reports 28 steps to enter, no home care services    Functional Level Prior to Admission: reports ADL independent, walks with a rollator    FAMILY HISTORY:  No pertinent family history      CBC Full  -  ( 2017 06:54 )  WBC Count : 5.6 K/uL  Hemoglobin : 11.3 g/dL  Hematocrit : 33.1 %  Platelet Count - Automated : 206 K/uL  Mean Cell Volume : 95.1 fL  Mean Cell Hemoglobin : 32.5 pg  Mean Cell Hemoglobin Concentration : 34.1 g/dL  Auto Neutrophil # : x  Auto Lymphocyte # : x  Auto Monocyte # : x  Auto Eosinophil # : x  Auto Basophil # : x  Auto Neutrophil % : x  Auto Lymphocyte % : x  Auto Monocyte % : x  Auto Eosinophil % : x  Auto Basophil % : x      2017 06:07    139    |  103    |  20     ----------------------------<  87     3.8     |  31     |  1.01     Ca    8.0        2017 06:07  Phos  2.7       2017 06:07  Mg     1.8       2017 06:07    TPro  7.1    /  Alb  2.7    /  TBili  1.1    /  DBili  x      /  AST  38     /  ALT  14     /  AlkPhos  91     2017 14:16      Urinalysis Basic - ( 2017 17:20 )    Color: Yellow / Appearance: Clear / S.015 / pH: x  Gluc: x / Ketone: NEGATIVE  / Bili: NEGATIVE / Urobili: 0.2 E.U./dL   Blood: x / Protein: Trace mg/dL / Nitrite: NEGATIVE   Leuk Esterase: NEGATIVE / RBC: 5-10 /HPF / WBC < 5 /HPF   Sq Epi: x / Non Sq Epi: Rare /HPF / Bacteria: Present /HPF      Radiology:    EXAM:  XR ANKLE-RIGHT-2 VIEWS                           PROCEDURE DATE:  2017                 INTERPRETATION:  XR ANKLE-RIGHT-2 VIEWS DATED 2017 2:37 PM    INDICATION: 89 years-old Male with open wound left medial mall,   ?osteo/air.    PRIOR STUDIES: Right ankle radiographs 2014.    FINDINGS: 3 views of the right ankle are submitted. There is a skin   defect adjacent the medial aspect of the tibial diametaphysis. This is   similar in position to the prior study of 2014. Underlying chronic   appearing periosteal bone formation is present. There is diffuse soft   tissue swelling of the ankle and foot. Ankle mortise is incongruous with   narrowing noted laterally. There are degenerative changes at the   tibiotalar joint. No acute fractures identified.    IMPRESSION: Skin ulceration along the medial aspect of the ankle with   chronic appearing periosteal reaction the underlying tibia. Osteomyelitis   cannot be excluded.    EXAM:  XR FOOT-RIGHT-2 VIEWS                           PROCEDURE DATE:  2017                 INTERPRETATION:  XR FOOT-RIGHT-2 VIEWS DATED 2017 2:37 PM    INDICATION: 89 years-old Male with open wound medial mal, ?OM.    PRIOR STUDIES: None    FINDINGS: AP and 2 lateral views of the right foot are submitted There   are degenerative changes of the hindfoot and midfoot. Hammering of the   digits and hallux valgus deformity with associated degenerative changes   of the MTP joint. No fracture or dislocation. Decreased bony   mineralization. Plantar calcaneal spur. Vascular calcifications. Diffuse   soft tissue swelling.    IMPRESSION: Diffuse soft tissue swelling. No fracture or dislocation.      Vital Signs Last 24 Hrs  T(C): 36.7, Max: 36.8 ( @ 17:44)  T(F): 98.1, Max: 98.2 ( @ 17:44)  HR: 76 (76 - 80)  BP: 161/80 (114/71 - 161/80)  BP(mean): --  RR: 17 (17 - 18)  SpO2: 98% (95% - 98%)    REVIEW OF SYSTEMS:    CONSTITUTIONAL:  fatigue  EYES: No eye pain, visual disturbances, or discharge  ENMT:  No difficulty hearing, tinnitus, vertigo; No sinus or throat pain  NECK: No pain or stiffness  BREASTS: No pain, masses, or nipple discharge  RESPIRATORY: No cough, wheezing, chills or hemoptysis; No shortness of breath  CARDIOVASCULAR: No chest pain, palpitations, dizziness, or leg swelling  GASTROINTESTINAL: No abdominal or epigastric pain. No nausea, vomiting, or hematemesis; No diarrhea or constipation. No melena or hematochezia.  GENITOURINARY: No dysuria, frequency, hematuria, or incontinence  NEUROLOGICAL: No headaches, memory loss, loss of strength, numbness, or tremors  SKIN: No itching, burning, rashes, or lesions   LYMPH NODES: No enlarged glands  ENDOCRINE: No heat or cold intolerance; No hair loss  MUSCULOSKELETAL: low back  pain  PSYCHIATRIC: No depression, anxiety, mood swings, or difficulty sleeping  HEME/LYMPH: No easy bruising, or bleeding gums  ALLERGY AND IMMUNOLOGIC: No hives or eczema      Physical Exam: unkempt appearing  male lying in bed, c/o LBP    HEENT: normocephalic/ atraumatic, anicteric, left eye blindness    Neck: supple, negative JVD, negative carotid bruits,    Chest: CTA bilaterally, neg wheeze, rhonchi, rales, egophany, midline surgical scar well healed    Cardiovascular: regular rate and rhythm, neg murmurs/rubs/gallops    Abdomen: soft, non tender, negative rebound/guarding, non distended, normal bowel sounds, neg hepatosplenomegaly    Extremities: 1+ non pitting R LE edema, negative calf tenderness to palpation, negative Tera's sign, right med malleolus ulcer    Spine: nlcurvature, neg ecchymosis, jack L3-4 paraspinal tenderness, neg straight leg raise    Neurologic Exam:    Alert and oriented to person, place, date/year, speech fluent w/o dysarthria, repetition intact, comprehension intact,     Cranial Nerves:     II:                      pupils equal, L pupil non reactive to light, left eye blindness  III/ IV/VI:            extraocular movements intact, neg nystagmus, ptosis  V:                     facial sensation intact, V1-3 normal  VII:                    face symmetric, no droop, normal eye closure and smile  VIII:                   hearing intact to finger rub bilaterally  IX/ X:                 soft palate rise symmetrical  XI:                     head turning, shoulder shrug normal  XII:                    tongue midline    Motor Exam:    Bilateral UE:        4+/5 /intrinsics                            5/5 biceps/triceps/wrist extensors-flexors/deltoid                            negative pronator drift      Bilateral LE:        4/5 hip flexors/adductors/abductors                            4+/5 quadriceps/hamstrings                            4/5 dorsiflexors/plantar flexors/invertors-evertors    Sensory: intact to LT/PP in all UE/LE dermatomes    DTR:       = biceps/     triceps/     brachioradialis                = patella/   medial hamstring/    ankle                 neg clonus                 neg Babinski                 neg Hoffmans    Finger to Nose: wnl    Heel to Shin:      wnl    Rapid Alternating movements:  wnl    Joint Position Sense:  intact    Gait:  NT        PM&R Impression:    1) lumbar myofascial pain, s/p fall  2) deconditioned  3) gait dysfunction      Recommendations:    1) recommend Lumbar XR: PA/LAT    2) Physical therapy focusing on therapeutic exercises, bed mobility/transfer out of bed evaluation, progressive ambulation with assistive devices, stair negotiation    3) Disposition Plan:  pending functional progress, anticipate d/c home with home physical therapy, home care services evaluation

## 2017-01-27 NOTE — PROGRESS NOTE ADULT - PROBLEM SELECTOR PLAN 1
Patient presents with fall, likely mechanical as patient with sig. r.knee osteoarthritis and frequently leans on left leg; patient with unkempt appearance and found to have maggots in ulcer on arrival. No sob, chest pain, neurological sequelae suggesting cardiac event, or seizure like activity. Patient likely with chronic deconditioning and has not seen physician in several years. Patient agreeing to be admitted because he does not have a way to go home currently.   -Will add-on  CK as patient on floor for hours per him and will start on gentle hydration with NS at 80cc/hr  -PT/OT consult in AM  -F/U social work for placement  -Patient without Physician for care management, will likely need to be plugged into [facility] for follow up  -check EKG (patient refused during night, will try again this AM) Patient presents with fall, likely mechanical as patient with sig. r.knee osteoarthritis and frequently leans on left leg; patient with unkempt appearance and found to have maggots in ulcer on arrival. No sob, chest pain, neurological sequelae suggesting cardiac event, or seizure like activity. Patient likely with chronic deconditioning and has not seen physician in several years. EKG showed 1st degree block with Atrial Premature contractions.    -PT: Home with Home PT  -F/U social work   -Patient without Physician for care management, will likely need to be plugged into [facility] for follow up

## 2017-01-27 NOTE — DISCHARGE NOTE ADULT - CARE PROVIDER_API CALL
Jose Tristan), Internal Medicine  [address] [place] [address]  Phone: [phone]  Fax: [phone] Jose Tristan), Internal Medicine  [address] [place] [address]  Phone: [phone]  Fax: [phone]

## 2017-01-27 NOTE — PROGRESS NOTE ADULT - PROBLEM SELECTOR PLAN 1
appreciate Podiatry recs; cont. wound care per recs; no concern for SSTI or underlying OM, no indication for abx at this time

## 2017-01-27 NOTE — PROGRESS NOTE ADULT - SUBJECTIVE AND OBJECTIVE BOX
INTERVAL HPI/OVERNIGHT EVENTS:  Patient was seen and examined at bedside. As per nurse and patient, no o/n events, patient resting comfortably. No complaints at this time. Patient denies fever, chills, dizziness, weakness, HA, Changes in vision, CP, palpitations, SOB, cough, N/V/D/C, dysuria, changes in bowel movements, LE edema.    VITAL SIGNS:  T(F): 98.1  HR: 76  BP: 161/80  RR: 17  SpO2: 98%  Wt(kg): --    PHYSICAL EXAM:    Constitutional: WDWN, NAD  Eyes: PERRL, EOMI, sclera non-icteric  Neck: supple, trachea midline, no masses, no JVD  Respiratory: CTA b/l, good air entry b/l, no wheezing, rhonchi, rales, without accessory muscle use and no intercostal retractions  Cardiovascular: RRR, normal S1S2, no M/R/G  Gastrointestinal: soft, NTND, no masses palpable, BS normal  Extremities: Warm, well perfused, pulses equal bilateral upper and lower extremities, no edema, no clubbing  Neurological: AAOx3, CN Grossly intact  Skin: Normal temperature, warm, dry    MEDICATIONS  (STANDING):  heparin  Injectable 5000Unit(s) SubCutaneous every 8 hours    MEDICATIONS  (PRN):      Allergies    iodine containing compounds (Unknown)    Intolerances        LABS:                        11.3   5.6   )-----------( 206      ( 2017 06:54 )             33.1     2017 06:07    139    |  103    |  20     ----------------------------<  87     3.8     |  31     |  1.01     Ca    8.0        2017 06:07  Phos  2.7       2017 06:07  Mg     1.8       2017 06:07    TPro  7.1    /  Alb  2.7    /  TBili  1.1    /  DBili  x      /  AST  38     /  ALT  14     /  AlkPhos  91     2017 14:16      Urinalysis Basic - ( 2017 17:20 )    Color: Yellow / Appearance: Clear / S.015 / pH: x  Gluc: x / Ketone: NEGATIVE  / Bili: NEGATIVE / Urobili: 0.2 E.U./dL   Blood: x / Protein: Trace mg/dL / Nitrite: NEGATIVE   Leuk Esterase: NEGATIVE / RBC: 5-10 /HPF / WBC < 5 /HPF   Sq Epi: x / Non Sq Epi: Rare /HPF / Bacteria: Present /HPF        RADIOLOGY & ADDITIONAL TESTS: INTERVAL HPI/OVERNIGHT EVENTS:  Patient was seen and examined at bedside. ROMEO overnight.     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 Wound has been cleaned and bandaged by wound care on ; Patient with some TTP of leg. Noticeable assymetry between Right and left lower extremities, non-pitting edema on RLE. Strenght 4-4+ on bilaterally uppler extremities; 4-4+ on bilateral lower extremities. Assymetric size of R.knee and L.Knee, right knee non painful to palpation, no fluctuance or erythema overlying area; erythema decreased    Neurological: AAOx3, CN Grossly intact  Skin: Normal temperature, warm, dry      MEDICATIONS  (STANDING):  heparin  Injectable 5000Unit(s) SubCutaneous every 8 hours    MEDICATIONS  (PRN):      Allergies    iodine containing compounds (Unknown)    Intolerances        LABS:                        11.3   5.6   )-----------( 206      ( 2017 06:54 )             33.1     2017 06:07    139    |  103    |  20     ----------------------------<  87     3.8     |  31     |  1.01     Ca    8.0        2017 06:07  Phos  2.7       2017 06:07  Mg     1.8       2017 06:07    TPro  7.1    /  Alb  2.7    /  TBili  1.1    /  DBili  x      /  AST  38     /  ALT  14     /  AlkPhos  91     2017 14:16      Urinalysis Basic - ( 2017 17:20 )    Color: Yellow / Appearance: Clear / S.015 / pH: x  Gluc: x / Ketone: NEGATIVE  / Bili: NEGATIVE / Urobili: 0.2 E.U./dL   Blood: x / Protein: Trace mg/dL / Nitrite: NEGATIVE   Leuk Esterase: NEGATIVE / RBC: 5-10 /HPF / WBC < 5 /HPF   Sq Epi: x / Non Sq Epi: Rare /HPF / Bacteria: Present /HPF        RADIOLOGY & ADDITIONAL TESTS:

## 2017-01-27 NOTE — PROGRESS NOTE ADULT - ATTENDING COMMENTS
Dispo: medically-clear for d/c home pending safe plan
Podiatry consulted, f/u recs  check MRI R foot  PT and SW consults  Pt. refuses abx at this time; he is A&Ox3; he understands risks of refusal, including death

## 2017-01-28 VITALS
SYSTOLIC BLOOD PRESSURE: 158 MMHG | TEMPERATURE: 97 F | HEART RATE: 81 BPM | OXYGEN SATURATION: 98 % | DIASTOLIC BLOOD PRESSURE: 78 MMHG | RESPIRATION RATE: 18 BRPM

## 2017-01-28 PROCEDURE — 99238 HOSP IP/OBS DSCHRG MGMT 30/<: CPT
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
## 2017-02-01 DIAGNOSIS — M79.1 MYALGIA: ICD-10-CM

## 2017-02-01 DIAGNOSIS — Z91.048 OTHER NONMEDICINAL SUBSTANCE ALLERGY STATUS: ICD-10-CM

## 2017-02-01 DIAGNOSIS — L97.312 NON-PRESSURE CHRONIC ULCER OF RIGHT ANKLE WITH FAT LAYER EXPOSED: ICD-10-CM

## 2017-02-01 DIAGNOSIS — I44.0 ATRIOVENTRICULAR BLOCK, FIRST DEGREE: ICD-10-CM

## 2017-02-01 DIAGNOSIS — H91.90 UNSPECIFIED HEARING LOSS, UNSPECIFIED EAR: ICD-10-CM

## 2017-02-01 DIAGNOSIS — I50.22 CHRONIC SYSTOLIC (CONGESTIVE) HEART FAILURE: ICD-10-CM

## 2017-02-01 DIAGNOSIS — R62.7 ADULT FAILURE TO THRIVE: ICD-10-CM
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
## 2017-02-01 DIAGNOSIS — Z95.2 PRESENCE OF PROSTHETIC HEART VALVE: ICD-10-CM

## 2017-02-01 DIAGNOSIS — R26.9 UNSPECIFIED ABNORMALITIES OF GAIT AND MOBILITY: ICD-10-CM

## 2017-02-01 DIAGNOSIS — M17.11 UNILATERAL PRIMARY OSTEOARTHRITIS, RIGHT KNEE: ICD-10-CM

## 2017-02-01 DIAGNOSIS — I83.013 VARICOSE VEINS OF RIGHT LOWER EXTREMITY WITH ULCER OF ANKLE: ICD-10-CM

## 2017-02-01 DIAGNOSIS — B87.0 CUTANEOUS MYIASIS: ICD-10-CM

## 2017-02-01 DIAGNOSIS — Z87.891 PERSONAL HISTORY OF NICOTINE DEPENDENCE: ICD-10-CM

## 2017-08-11 ENCOUNTER — INPATIENT (INPATIENT)
Facility: HOSPITAL | Age: 82
LOS: 2 days | Discharge: HOME HEALTH | DRG: 554 | End: 2017-08-14
Attending: STUDENT IN AN ORGANIZED HEALTH CARE EDUCATION/TRAINING PROGRAM | Admitting: STUDENT IN AN ORGANIZED HEALTH CARE EDUCATION/TRAINING PROGRAM
Payer: MEDICARE

## 2017-08-11 VITALS
SYSTOLIC BLOOD PRESSURE: 157 MMHG | TEMPERATURE: 98 F | HEIGHT: 69 IN | WEIGHT: 145.06 LBS | HEART RATE: 82 BPM | RESPIRATION RATE: 18 BRPM | DIASTOLIC BLOOD PRESSURE: 95 MMHG | OXYGEN SATURATION: 95 %

## 2017-08-11 DIAGNOSIS — Z95.2 PRESENCE OF PROSTHETIC HEART VALVE: ICD-10-CM

## 2017-08-11 DIAGNOSIS — R60.0 LOCALIZED EDEMA: ICD-10-CM

## 2017-08-11 DIAGNOSIS — L97.319 NON-PRESSURE CHRONIC ULCER OF RIGHT ANKLE WITH UNSPECIFIED SEVERITY: ICD-10-CM

## 2017-08-11 DIAGNOSIS — Z65.9 PROBLEM RELATED TO UNSPECIFIED PSYCHOSOCIAL CIRCUMSTANCES: ICD-10-CM

## 2017-08-11 DIAGNOSIS — Z02.9 ENCOUNTER FOR ADMINISTRATIVE EXAMINATIONS, UNSPECIFIED: ICD-10-CM

## 2017-08-11 DIAGNOSIS — M19.90 UNSPECIFIED OSTEOARTHRITIS, UNSPECIFIED SITE: ICD-10-CM

## 2017-08-11 DIAGNOSIS — R63.8 OTHER SYMPTOMS AND SIGNS CONCERNING FOOD AND FLUID INTAKE: ICD-10-CM

## 2017-08-11 DIAGNOSIS — Z29.9 ENCOUNTER FOR PROPHYLACTIC MEASURES, UNSPECIFIED: ICD-10-CM

## 2017-08-11 LAB
ALBUMIN SERPL ELPH-MCNC: 4 G/DL — SIGNIFICANT CHANGE UP (ref 3.3–5)
ALP SERPL-CCNC: 104 U/L — SIGNIFICANT CHANGE UP (ref 40–120)
ALT FLD-CCNC: 13 U/L — SIGNIFICANT CHANGE UP (ref 10–45)
ANION GAP SERPL CALC-SCNC: 13 MMOL/L — SIGNIFICANT CHANGE UP (ref 5–17)
AST SERPL-CCNC: 25 U/L — SIGNIFICANT CHANGE UP (ref 10–40)
BASOPHILS NFR BLD AUTO: 0.6 % — SIGNIFICANT CHANGE UP (ref 0–2)
BILIRUB SERPL-MCNC: 0.4 MG/DL — SIGNIFICANT CHANGE UP (ref 0.2–1.2)
BUN SERPL-MCNC: 18 MG/DL — SIGNIFICANT CHANGE UP (ref 7–23)
CALCIUM SERPL-MCNC: 9.8 MG/DL — SIGNIFICANT CHANGE UP (ref 8.4–10.5)
CHLORIDE SERPL-SCNC: 96 MMOL/L — SIGNIFICANT CHANGE UP (ref 96–108)
CO2 SERPL-SCNC: 29 MMOL/L — SIGNIFICANT CHANGE UP (ref 22–31)
CREAT SERPL-MCNC: 1.1 MG/DL — SIGNIFICANT CHANGE UP (ref 0.5–1.3)
EOSINOPHIL NFR BLD AUTO: 1.1 % — SIGNIFICANT CHANGE UP (ref 0–6)
EXTRA BLUE TOP TUBE: SIGNIFICANT CHANGE UP
GLUCOSE SERPL-MCNC: 102 MG/DL — HIGH (ref 70–99)
HCT VFR BLD CALC: 38.6 % — LOW (ref 39–50)
HGB BLD-MCNC: 12.9 G/DL — LOW (ref 13–17)
LYMPHOCYTES # BLD AUTO: 14.4 % — SIGNIFICANT CHANGE UP (ref 13–44)
MAGNESIUM SERPL-MCNC: 2 MG/DL — SIGNIFICANT CHANGE UP (ref 1.6–2.6)
MCHC RBC-ENTMCNC: 32.3 PG — SIGNIFICANT CHANGE UP (ref 27–34)
MCHC RBC-ENTMCNC: 33.4 G/DL — SIGNIFICANT CHANGE UP (ref 32–36)
MCV RBC AUTO: 96.5 FL — SIGNIFICANT CHANGE UP (ref 80–100)
MONOCYTES NFR BLD AUTO: 9.9 % — SIGNIFICANT CHANGE UP (ref 2–14)
NEUTROPHILS NFR BLD AUTO: 74 % — SIGNIFICANT CHANGE UP (ref 43–77)
PLATELET # BLD AUTO: 195 K/UL — SIGNIFICANT CHANGE UP (ref 150–400)
POTASSIUM SERPL-MCNC: 4.5 MMOL/L — SIGNIFICANT CHANGE UP (ref 3.5–5.3)
POTASSIUM SERPL-SCNC: 4.5 MMOL/L — SIGNIFICANT CHANGE UP (ref 3.5–5.3)
PROT SERPL-MCNC: 7.7 G/DL — SIGNIFICANT CHANGE UP (ref 6–8.3)
RBC # BLD: 4 M/UL — LOW (ref 4.2–5.8)
RBC # FLD: 12.1 % — SIGNIFICANT CHANGE UP (ref 10.3–16.9)
SODIUM SERPL-SCNC: 138 MMOL/L — SIGNIFICANT CHANGE UP (ref 135–145)
WBC # BLD: 5.4 K/UL — SIGNIFICANT CHANGE UP (ref 3.8–10.5)
WBC # FLD AUTO: 5.4 K/UL — SIGNIFICANT CHANGE UP (ref 3.8–10.5)

## 2017-08-11 PROCEDURE — 73700 CT LOWER EXTREMITY W/O DYE: CPT | Mod: 26,RT

## 2017-08-11 PROCEDURE — 73502 X-RAY EXAM HIP UNI 2-3 VIEWS: CPT | Mod: 26,RT

## 2017-08-11 PROCEDURE — 73562 X-RAY EXAM OF KNEE 3: CPT | Mod: 26,RT

## 2017-08-11 PROCEDURE — 99222 1ST HOSP IP/OBS MODERATE 55: CPT | Mod: GC

## 2017-08-11 PROCEDURE — 70450 CT HEAD/BRAIN W/O DYE: CPT | Mod: 26

## 2017-08-11 PROCEDURE — 99284 EMERGENCY DEPT VISIT MOD MDM: CPT

## 2017-08-11 PROCEDURE — 93010 ELECTROCARDIOGRAM REPORT: CPT

## 2017-08-11 PROCEDURE — 99285 EMERGENCY DEPT VISIT HI MDM: CPT | Mod: 25

## 2017-08-11 RX ORDER — SODIUM CHLORIDE 9 MG/ML
1000 INJECTION INTRAMUSCULAR; INTRAVENOUS; SUBCUTANEOUS
Qty: 0 | Refills: 0 | Status: DISCONTINUED | OUTPATIENT
Start: 2017-08-11 | End: 2017-08-11

## 2017-08-11 RX ORDER — HEPARIN SODIUM 5000 [USP'U]/ML
5000 INJECTION INTRAVENOUS; SUBCUTANEOUS EVERY 8 HOURS
Qty: 0 | Refills: 0 | Status: DISCONTINUED | OUTPATIENT
Start: 2017-08-11 | End: 2017-08-14

## 2017-08-11 RX ORDER — ACETAMINOPHEN 500 MG
650 TABLET ORAL EVERY 6 HOURS
Qty: 0 | Refills: 0 | Status: DISCONTINUED | OUTPATIENT
Start: 2017-08-11 | End: 2017-08-14

## 2017-08-11 RX ADMIN — SODIUM CHLORIDE 125 MILLILITER(S): 9 INJECTION INTRAMUSCULAR; INTRAVENOUS; SUBCUTANEOUS at 14:50

## 2017-08-11 RX ADMIN — HEPARIN SODIUM 5000 UNIT(S): 5000 INJECTION INTRAVENOUS; SUBCUTANEOUS at 22:33

## 2017-08-11 NOTE — ED PROVIDER NOTE - MEDICAL DECISION MAKING DETAILS
Pt BIBA s/p ? being found in the st. Hx unclear. Pt mildly confused, reports "memory problems." Likely mild dementia. Pt needs walker to ambulate but lives alone in 3rd floor walk-up w/o any home care, no near by family. CM saw pt - f/u w/ home care - no current home care. Pt supposed to be following up with Dr Dey, however pt has not seen a MD in unknown period of time. Pt disheveled, dirty, unable to care for self, fall risk. Pt needs social admission. Will get basic labs, XR to r/o fx.

## 2017-08-11 NOTE — H&P ADULT - ASSESSMENT
89yo M w/PMHx of OA and unspecified valvuloplasty who was BIBEMS after having a witnessed mechanical fall in the street w/o head trauma or LOC, with difficulty ambulating and carrying out tasks of daily living at home alone
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
## 2017-08-11 NOTE — H&P ADULT - PROBLEM SELECTOR PLAN 7
DASH/TLC diet  Replete electrolytes PRN Patient with history of OA, with changes on knee and hip x-ray consistent with OA.  - Tylenol 650 Q6H PRN for pain

## 2017-08-11 NOTE — H&P ADULT - PROBLEM SELECTOR PLAN 2
Patient with ulceration 3cm x 3cm on the RLE with significant scabbing, dry skin and erythema suggestive of chronic vascular disease. No warmth, induration, fluctuance, Patient with ulceration 3cm x 3cm on the RLE with significant scabbing, dry skin and erythema suggestive of chronic vascular disease. No warmth, induration, fluctuance, no drainage or purulence.   - Wound care  - f/u Arterial duplex b/l LE negative CT head, xrays; follow up CT R knee report Patient is 89yo  male who lives alone at home with no family, no HHA, ambulating with walker and living in a three story walk up, found down by EMS after witnessed mechanical fall with no head trauma or LOC, CT head negative, xray of hips and knees negative for fracture.  - Admit to Medicine  - SW eval  - PT eval  - Safe discharge planning

## 2017-08-11 NOTE — ED ADULT NURSE REASSESSMENT NOTE - NS ED NURSE REASSESS COMMENT FT1
Spoke to patient about placing another IV for access since pt ripped out first IV.  pt refused to have another IV placed.  Stated "its against my will:"

## 2017-08-11 NOTE — ED ADULT TRIAGE NOTE - OTHER COMPLAINTS
As per EMS, pt fell on a street, no LOC, no head injury, no blood thinner. EMS states "he fell and was stumbling, so they call us, his house was very dirty and he lives alone."  Hx of arthritis, heart valve replacement. Pt denies chest pain, dizziness, SOB, fever. Pt is A&O x 3 and able to speak coherently.

## 2017-08-11 NOTE — H&P ADULT - HISTORY OF PRESENT ILLNESS
Patient is a 89yo M who reports PMHx only significant for OA who was BIBEMS after having a witnessed fall in the street. Patient cannot remember the incident, but sites "memory problems". Per ED and EMS report, patient had witnessed fall with no head trauma or LOC. Patient denies any current pain aside from the chronic pain he has in the b/l knees, states he takes no medications, and hasn't seen a doctor in years. He moved to [place] with his wife after their children left [place], though she  in the . He has since lived alone with no close family and no HHA. He lives in a three story walk-up building and uses a walker to ambulate outside, but props himself up on the walk to walk inside the apartment. Patient maintains he has no issues taking care of himself at home, but appears disheveled and with clothes that have not been changed in quite some time. Of note, patient was admitted in January for similar complaint when he had a fall at home and his super called EMS to bring him to the ED. Patient was supposed to follow up with [facility] and Cardiology at that time but neglected to do so, stating he believes in "natura" medicine. Patient otherwise denies complaints. Denies fevers, chills, CP, palpitations, SOB, cough, abdominal pain, n/v/d/c, dysuria, LE edema, orthopnea, PND.    In the ED, VS T 97.5, HR 82, /95, R 18, SpO2 95%. Labs showing WBC of 5.4 without neutrophilia, Hgb 12.9 w/baseline ~11.5, Cr 1.10 (baseline 1.01). CT head showing no evidence of CVA, only parenchymal volume loss and microangiopathic white matter ischemic changes. X-rays of the hip and knees were negative for fracture, only showing evidence of OA. Patient was given a 1L NS bolus and admitted to Medicine.

## 2017-08-11 NOTE — H&P ADULT - NSHPPHYSICALEXAM_GEN_ALL_CORE
.  VITAL SIGNS:  T(C): 36.4 (08-11-17 @ 16:17), Max: 36.4 (08-11-17 @ 13:42)  T(F): 97.6 (08-11-17 @ 16:17), Max: 97.6 (08-11-17 @ 16:17)  HR: 89 (08-11-17 @ 16:17) (82 - 89)  BP: 155/74 (08-11-17 @ 16:17) (155/74 - 157/95)  BP(mean): --  RR: 18 (08-11-17 @ 16:17) (18 - 18)  SpO2: 95% (08-11-17 @ 16:17) (95% - 95%)  Wt(kg): --    PHYSICAL EXAM:    Constitutional: Disheveled, elderly male in NAD  Head: NC/AT  Eyes: L pupil fixed at 1mm, R pupil reactive, EOMI  ENT: no nasal discharge; uvula midline, no oropharyngeal erythema or exudates; MMM  Neck: supple; no JVD or thyromegaly  Respiratory: Fine inspiratory crackles heard on L lung base, R lung clear  Cardiac: +S1/S2; RRR; soft grade II/VI systolic murmur, no heaves  Gastrointestinal: soft, NT/ND; no rebound or guarding; +BSx4; small umbilical rash and dirt in skin folds  Back: spine midline, no bony tenderness or step-offs; no CVAT B/L  Extremities: Feel cool, poor nail hygiene, dirt between toes, 1+ distal pulses, b/l LE edema RLE>LLE; R knee with bony changes of chronic OA  Musculoskeletal: NROM x4; no joint swelling, tenderness or erythema  Vascular: 2+ radial, 1+ DP/PT pulses B/L  Lymphatic: no submandibular or cervical LAD  Neurologic: AAOx2; no focal deficits other than pupil .  VITAL SIGNS:  T(C): 36.4 (08-11-17 @ 16:17), Max: 36.4 (08-11-17 @ 13:42)  T(F): 97.6 (08-11-17 @ 16:17), Max: 97.6 (08-11-17 @ 16:17)  HR: 89 (08-11-17 @ 16:17) (82 - 89)  BP: 155/74 (08-11-17 @ 16:17) (155/74 - 157/95)  BP(mean): --  RR: 18 (08-11-17 @ 16:17) (18 - 18)  SpO2: 95% (08-11-17 @ 16:17) (95% - 95%)  Wt(kg): --    PHYSICAL EXAM:    Constitutional: Disheveled, elderly male in NAD  Head: NC/AT  Eyes: L pupil fixed at 1mm, R pupil reactive, EOMI  ENT: no nasal discharge; uvula midline, no oropharyngeal erythema or exudates; MMM  Neck: supple; no JVD or thyromegaly  Respiratory: Fine inspiratory crackles heard on L lung base, R lung clear  Cardiac: Large, well-healed thoracotomy scar, +S1/S2; RRR; soft grade II/VI systolic murmur, no heaves  Gastrointestinal: soft, NT/ND; no rebound or guarding; +BSx4; small umbilical rash and dirt in skin folds  Back: spine midline, no bony tenderness or step-offs; no CVAT B/L  Extremities: Feel cool, poor nail hygiene, dirt between toes, 1+ distal pulses, b/l LE edema RLE>LLE; R knee with bony changes of chronic OA  Musculoskeletal: NROM x4; no joint swelling, tenderness or erythema  Vascular: 2+ radial, 1+ DP/PT pulses B/L  Lymphatic: no submandibular or cervical LAD  Neurologic: AAOx2; no focal deficits other than pupil

## 2017-08-11 NOTE — ED PROVIDER NOTE - DIAGNOSTIC INTERPRETATION
ER Physician: Yun Russell DO  Hip / pelvis INTERPRETATION:  no acute fracture; no soft tissue swelling noted; normal bony alignment.   ER Physician: Yun Russell DO  INTERPRETATION:  lucency in R lateral tibial plateau ? fx. degenerative changes; no soft tissue swelling noted; normal bony alignment.

## 2017-08-11 NOTE — H&P ADULT - PROBLEM SELECTOR PLAN 5
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 "many years ago". Patient denies taking any regular medications, has not been taking Aspirin or anticoagulants in years.  - ECHO from January showing annuloplasty ring is noted in the mitral position  - Patient supposed to follow up with Cardiology as outpatient but neglected to do so  - f/u w/Cardiology regarding anticoagulation/necessary medications LV EF 35% on ECHO in 1/2017; monitor fluid status, needs cardiology followup

## 2017-08-11 NOTE — ED PROVIDER NOTE - CONSTITUTIONAL, MLM
normal... Elderly, frail, disheveled, awake, alert, oriented to person, place, time/situation and in no apparent distress.
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
## 2017-08-11 NOTE — H&P ADULT - PROBLEM SELECTOR PLAN 6
HSQ Patient with thoracotomy scar on chest, states he had "some sort of valve replacement" at [facility] "many years ago". Patient denies taking any regular medications, has not been taking Aspirin or anticoagulants in years.  - ECHO from January showing annuloplasty ring is noted in the mitral position  - Patient supposed to follow up with Cardiology as outpatient but neglected to do so  - f/u w/Cardiology regarding anticoagulation/necessary medications

## 2017-08-11 NOTE — H&P ADULT - PROBLEM SELECTOR PLAN 1
Patient is 89yo  male who lives alone at home with no family, no HHA, ambulating with walker and living in a three story walk up, found down by EMS after witnessed mechanical fall with no head trauma or LOC, CT head negative, xray of hips and knees negative for fracture.  - Admit to Medicine  - SW eval  - PT eval  - Safe discharge planning negative CT head, xrays; follow up CT R knee report

## 2017-08-11 NOTE — H&P ADULT - NSHPLABSRESULTS_GEN_ALL_CORE
.  LABS:                         12.9   5.4   )-----------( 195      ( 11 Aug 2017 14:56 )             38.6     08-11    138  |  96  |  18  ----------------------------<  102<H>  4.5   |  29  |  1.10    Ca    9.8      11 Aug 2017 14:56  Mg     2.0     08-11    TPro  7.7  /  Alb  4.0  /  TBili  0.4  /  DBili  x   /  AST  25  /  ALT  13  /  AlkPhos  104  08-11                  RADIOLOGY, EKG & ADDITIONAL TESTS: Reviewed.

## 2017-08-11 NOTE — H&P ADULT - NSHPSOCIALHISTORY_GEN_ALL_CORE
Denies EtOH  Denies smoking  Denies drugs  Lives alone with no family or HHA on 3-story walk up  Uses walker to ambulate

## 2017-08-11 NOTE — H&P ADULT - PROBLEM SELECTOR PROBLEM 3
Leg edema, right Chronic ulcer of ankle, right, with unspecified severity Chronic ulcer of right lower extremity with fat layer exposed

## 2017-08-11 NOTE — H&P ADULT - ATTENDING COMMENTS
pt seen and examined; reviewed vs, labs  pt known to me from previous admission on 1/25/17.  pt a/f fall, unsafe discharge; pt with no complaints at bedside, pt pulled out IV given IVFs per RN, pt refused IVFs; pt discussed pineapple juice theory for longevity and RLower ext edema/ ulcer occuring when young  agree w/ PE findings as above, pt pleasant, dishevelled, conversive, has short term memory diffculties; pt w/ chronic distal  R lower ext ulcer, not purulent or tender  a/p:   1. Fall: negative CT head, xrays; follow up CT right knee  2. SW, PT, dc planning  3. chronic R lower ext ulcer: stable, plan as above  4. hx of MV replacement/ CHF: monitor fluid status; needs outpt cardiology followup

## 2017-08-11 NOTE — ED PROVIDER NOTE - CARE PLAN
Principal Discharge DX:	Social problem  Secondary Diagnosis:	Chronic ulcer of ankle, right, with unspecified severity

## 2017-08-11 NOTE — ED PROVIDER NOTE - OBJECTIVE STATEMENT
Pt with PMHx MVR, OA, chronic R venous ulcer BIBA s/p fall. Hx unclear as pt reports "I have problems w/ my memory and don't recall what happened." EMS on arrival reports pt fell and bystanders called EMS. Pt reports he has poor balance and walks w/ a walker at baseline. Pt also reports chronic R knee pain w/ hx arthritis. Pt lives alone, has no home care / family. Pt reports he performs ADLs on his own. Pt is uncertain when he last saw a MD and does not take any medications. Pt has no complaints on exam.
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
## 2017-08-12 DIAGNOSIS — L97.912 NON-PRESSURE CHRONIC ULCER OF UNSPECIFIED PART OF RIGHT LOWER LEG WITH FAT LAYER EXPOSED: ICD-10-CM

## 2017-08-12 DIAGNOSIS — I50.9 HEART FAILURE, UNSPECIFIED: ICD-10-CM

## 2017-08-12 LAB
ANION GAP SERPL CALC-SCNC: 9 MMOL/L — SIGNIFICANT CHANGE UP (ref 5–17)
BUN SERPL-MCNC: 22 MG/DL — SIGNIFICANT CHANGE UP (ref 7–23)
CALCIUM SERPL-MCNC: 8.8 MG/DL — SIGNIFICANT CHANGE UP (ref 8.4–10.5)
CHLORIDE SERPL-SCNC: 100 MMOL/L — SIGNIFICANT CHANGE UP (ref 96–108)
CO2 SERPL-SCNC: 29 MMOL/L — SIGNIFICANT CHANGE UP (ref 22–31)
CREAT SERPL-MCNC: 1 MG/DL — SIGNIFICANT CHANGE UP (ref 0.5–1.3)
GLUCOSE SERPL-MCNC: 101 MG/DL — HIGH (ref 70–99)
HCT VFR BLD CALC: 34.4 % — LOW (ref 39–50)
HGB BLD-MCNC: 11.6 G/DL — LOW (ref 13–17)
MAGNESIUM SERPL-MCNC: 1.9 MG/DL — SIGNIFICANT CHANGE UP (ref 1.6–2.6)
MCHC RBC-ENTMCNC: 32.1 PG — SIGNIFICANT CHANGE UP (ref 27–34)
MCHC RBC-ENTMCNC: 33.7 G/DL — SIGNIFICANT CHANGE UP (ref 32–36)
MCV RBC AUTO: 95.3 FL — SIGNIFICANT CHANGE UP (ref 80–100)
PLATELET # BLD AUTO: 184 K/UL — SIGNIFICANT CHANGE UP (ref 150–400)
POTASSIUM SERPL-MCNC: 4.2 MMOL/L — SIGNIFICANT CHANGE UP (ref 3.5–5.3)
POTASSIUM SERPL-SCNC: 4.2 MMOL/L — SIGNIFICANT CHANGE UP (ref 3.5–5.3)
RBC # BLD: 3.61 M/UL — LOW (ref 4.2–5.8)
RBC # FLD: 12.6 % — SIGNIFICANT CHANGE UP (ref 10.3–16.9)
SODIUM SERPL-SCNC: 138 MMOL/L — SIGNIFICANT CHANGE UP (ref 135–145)
WBC # BLD: 3.7 K/UL — LOW (ref 3.8–10.5)
WBC # FLD AUTO: 3.7 K/UL — LOW (ref 3.8–10.5)

## 2017-08-12 PROCEDURE — 99232 SBSQ HOSP IP/OBS MODERATE 35: CPT

## 2017-08-12 RX ADMIN — HEPARIN SODIUM 5000 UNIT(S): 5000 INJECTION INTRAVENOUS; SUBCUTANEOUS at 21:40

## 2017-08-12 NOTE — PHYSICAL THERAPY INITIAL EVALUATION ADULT - ADDITIONAL COMMENTS
Patient reports independent with rolling walker PTA. (Per H&P): Patient moved to [place] with his wife after their children left [place], though she  in the . He has since lived alone with no close family and no HHA. He lives in a three story walk-up building and uses a walker to ambulate outside, but props himself up on the walk to walk inside the apartment. Patient maintains he has no issues taking care of himself at home, but appears disheveled and with clothes that have not been changed in quite some time. Of note, patient was admitted in January for similar complaint when he had a fall at home and his super called EMS to bring him to the ED. Patient was supposed to follow up with [facility] and Cardiology at that time but neglected to do so, stating he believes in "natura" medicine.

## 2017-08-12 NOTE — DIETITIAN INITIAL EVALUATION ADULT. - ENERGY NEEDS
Admission weight used as pt is within % ideal body weight.   Slightly increased needs secondary to geriatric pt with BMI <23.0; 91% IBW  Energy: 1513-1777kcal (23-27cal/kg) Admission weight used as pt is within % ideal body weight.   Slightly increased needs secondary to geriatric pt with BMI <23.0; 91% IBW; chronic ulcer  Energy: 1513-1777kcal (23-27cal/kg)  Fluids per team secondary to CHF

## 2017-08-12 NOTE — PROGRESS NOTE ADULT - PROBLEM SELECTOR PLAN 1
Pt status post witnessed fall on the street. Negative CT head, xrays.   CT of R knee showes no acute fracture or dislocation. Large knee effusion with evidence of old hemorrhage. Severe osteoarthritis. Pt status post witnessed fall on the street. Negative CT head, xrays.   CT of R knee showes no acute fracture or dislocation. Large knee effusion with evidence of old hemorrhage. Severe osteoarthritis.  -PT eval

## 2017-08-12 NOTE — PROGRESS NOTE ADULT - PROBLEM SELECTOR PLAN 5
LV EF 35% on ECHO in 1/2017; monitor fluid status, needs cardiology followup. Pt reports that he does not want to take medication given that he believes in "natural" medicine.

## 2017-08-12 NOTE — DIETITIAN INITIAL EVALUATION ADULT. - OTHER INFO
Pt BIBEMS s/p fall.  Of note, pt lives alone with no HHA.  Pt is a poor historian.  Limited nutrition history able to be obtained.  Per previous admission record, pt with UBW 67.3kg in January 2017 - pt has been relatively weight-stable.  Per RN, pt with ~60% breakfast intake.  Pt with no recent complaints of GI distress or pain.  NKFA.  Skin: chronic ulcer of right LE, leg edema.

## 2017-08-12 NOTE — PROGRESS NOTE ADULT - PROBLEM SELECTOR PLAN 7
Patient with history of OA, with changes on knee and hip x-ray consistent with OA.  - Tylenol 650 Q6H PRN for pain

## 2017-08-12 NOTE — PROGRESS NOTE ADULT - PROBLEM SELECTOR PLAN 6
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 "many years ago". Patient denies taking any regular medications, has not been taking Aspirin or anticoagulants in years.  - ECHO from January showing annuloplasty ring is noted in the mitral position  - Patient supposed to follow up with Cardiology as outpatient but neglected to do so  - f/u w/Cardiology regarding anticoagulation/necessary medications Patient with thoracotomy scar on chest, states he had "some sort of valve replacement" at [facility] "many years ago". Patient denies taking any regular medications, has not been taking Aspirin or anticoagulants in years.  - ECHO from January showing annuloplasty ring is noted in the mitral position  - Patient supposed to follow up with Cardiology as outpatient but neglected to do so  - f/u w/Cardiology regarding anticoagulation/necessary medications  - given pt w/ multiple falls in the past, his unwillingness to take medication or follow up with a physician, pt is not a good candidate for anticoagulation. HAS-BLED score of 2.

## 2017-08-12 NOTE — PHYSICAL THERAPY INITIAL EVALUATION ADULT - GAIT DISTANCE, PT EVAL
required min assist x4 to prevent loss of balance throughout ambulation this session, specifically noted with changing directions/50 feet

## 2017-08-12 NOTE — PROGRESS NOTE ADULT - SUBJECTIVE AND OBJECTIVE BOX
INTERVAL HPI/OVERNIGHT EVENTS: No acute events overnight. Pt reports that he lives alone and has no family in the city. Denies chest pain, sob, abdominal pain, n/v.     VITAL SIGNS:  T(F): 98.1 (08-12-17 @ 08:42)  HR: 67 (08-12-17 @ 08:42)  BP: 135/78 (08-12-17 @ 08:42)  RR: 18 (08-12-17 @ 08:42)  SpO2: 96% (08-12-17 @ 08:42)  Wt(kg): --    PHYSICAL EXAM:  General: No acute distress. Disheveled elderly gentleman.    HEENT: Normocephalic, Atraumtic. PEERL. MMM  Respiratory: Normal breath sounds b/l. No wheezes, crackles, rhonchi.  Cardiovascular: Normal S1 and S2. RRR. No rubs, murmurs.   Gastrointestinal: Soft, non-distended, non-tender. Positive bowel sounds.   Extremities: No LE edema.   Skin: No rashes or ulcers. No clubbing/cyanosis.   Vascular: 2+ dorsalis pedis pulses b/l  Neurological: No focal deficits. AAOx3.       MEDICATIONS  (STANDING):  heparin  Injectable 5000 Unit(s) SubCutaneous every 8 hours    MEDICATIONS  (PRN):  acetaminophen   Tablet. 650 milliGRAM(s) Oral every 6 hours PRN Moderate Pain (4 - 6)      Allergies    iodine containing compounds (Unknown)    Intolerances        LABS:                        12.9   5.4   )-----------( 195      ( 11 Aug 2017 14:56 )             38.6     08-11    138  |  96  |  18  ----------------------------<  102<H>  4.5   |  29  |  1.10    Ca    9.8      11 Aug 2017 14:56  Mg     2.0     08-11    TPro  7.7  /  Alb  4.0  /  TBili  0.4  /  DBili  x   /  AST  25  /  ALT  13  /  AlkPhos  104  08-11          RADIOLOGY & ADDITIONAL TESTS: Reviewed. INTERVAL HPI/OVERNIGHT EVENTS: No acute events overnight. Pt reports that he lives alone and has no family in the city, pt reports that he has no desire to take any medications and has not taken any medications or seen a physician for the last 20 years. Denies chest pain, sob, abdominal pain, n/v.     VITAL SIGNS:  T(F): 98.1 (08-12-17 @ 08:42)  HR: 67 (08-12-17 @ 08:42)  BP: 135/78 (08-12-17 @ 08:42)  RR: 18 (08-12-17 @ 08:42)  SpO2: 96% (08-12-17 @ 08:42)  Wt(kg): --    PHYSICAL EXAM:  General: No acute distress. Disheveled elderly gentleman.    HEENT: Normocephalic, Atraumtic. PEERL. MMM  Respiratory: Normal breath sounds b/l. No wheezes, crackles, rhonchi.  Cardiovascular: Normal S1 and S2. RRR. No rubs, murmurs.   Gastrointestinal: Soft, non-distended, non-tender. Positive bowel sounds.   Extremities: No LE edema.   Skin: No rashes or ulcers. No clubbing/cyanosis.   Vascular: 2+ dorsalis pedis pulses b/l  Neurological: No focal deficits. AAOx3 but pt does not recall events leading up to his arrival to the ED.       MEDICATIONS  (STANDING):  heparin  Injectable 5000 Unit(s) SubCutaneous every 8 hours    MEDICATIONS  (PRN):  acetaminophen   Tablet. 650 milliGRAM(s) Oral every 6 hours PRN Moderate Pain (4 - 6)      Allergies    iodine containing compounds (Unknown)    Intolerances        LABS:                        12.9   5.4   )-----------( 195      ( 11 Aug 2017 14:56 )             38.6     08-11    138  |  96  |  18  ----------------------------<  102<H>  4.5   |  29  |  1.10    Ca    9.8      11 Aug 2017 14:56  Mg     2.0     08-11    TPro  7.7  /  Alb  4.0  /  TBili  0.4  /  DBili  x   /  AST  25  /  ALT  13  /  AlkPhos  104  08-11          RADIOLOGY & ADDITIONAL TESTS: Reviewed.

## 2017-08-12 NOTE — PHYSICAL THERAPY INITIAL EVALUATION ADULT - GENERAL OBSERVATIONS, REHAB EVAL
Patient received semi-supine no acute distress +bed alarm +IV heplock +room air. Patient left as found +call fields +Bed alarm. RN Carey aware.

## 2017-08-13 LAB
ANION GAP SERPL CALC-SCNC: 9 MMOL/L — SIGNIFICANT CHANGE UP (ref 5–17)
BUN SERPL-MCNC: 25 MG/DL — HIGH (ref 7–23)
CALCIUM SERPL-MCNC: 9 MG/DL — SIGNIFICANT CHANGE UP (ref 8.4–10.5)
CHLORIDE SERPL-SCNC: 102 MMOL/L — SIGNIFICANT CHANGE UP (ref 96–108)
CO2 SERPL-SCNC: 28 MMOL/L — SIGNIFICANT CHANGE UP (ref 22–31)
CREAT SERPL-MCNC: 1.1 MG/DL — SIGNIFICANT CHANGE UP (ref 0.5–1.3)
GLUCOSE SERPL-MCNC: 100 MG/DL — HIGH (ref 70–99)
HCT VFR BLD CALC: 36.4 % — LOW (ref 39–50)
HGB BLD-MCNC: 12.7 G/DL — LOW (ref 13–17)
MAGNESIUM SERPL-MCNC: 1.9 MG/DL — SIGNIFICANT CHANGE UP (ref 1.6–2.6)
MCHC RBC-ENTMCNC: 32.9 PG — SIGNIFICANT CHANGE UP (ref 27–34)
MCHC RBC-ENTMCNC: 34.9 G/DL — SIGNIFICANT CHANGE UP (ref 32–36)
MCV RBC AUTO: 94.3 FL — SIGNIFICANT CHANGE UP (ref 80–100)
PLATELET # BLD AUTO: 175 K/UL — SIGNIFICANT CHANGE UP (ref 150–400)
POTASSIUM SERPL-MCNC: 4.6 MMOL/L — SIGNIFICANT CHANGE UP (ref 3.5–5.3)
POTASSIUM SERPL-SCNC: 4.6 MMOL/L — SIGNIFICANT CHANGE UP (ref 3.5–5.3)
RBC # BLD: 3.86 M/UL — LOW (ref 4.2–5.8)
RBC # FLD: 12.6 % — SIGNIFICANT CHANGE UP (ref 10.3–16.9)
SODIUM SERPL-SCNC: 139 MMOL/L — SIGNIFICANT CHANGE UP (ref 135–145)
WBC # BLD: 4.5 K/UL — SIGNIFICANT CHANGE UP (ref 3.8–10.5)
WBC # FLD AUTO: 4.5 K/UL — SIGNIFICANT CHANGE UP (ref 3.8–10.5)

## 2017-08-13 PROCEDURE — 99232 SBSQ HOSP IP/OBS MODERATE 35: CPT

## 2017-08-13 PROCEDURE — 93970 EXTREMITY STUDY: CPT | Mod: 26

## 2017-08-13 PROCEDURE — 93925 LOWER EXTREMITY STUDY: CPT | Mod: 26

## 2017-08-13 RX ORDER — POLYETHYLENE GLYCOL 3350 17 G/17G
17 POWDER, FOR SOLUTION ORAL ONCE
Qty: 0 | Refills: 0 | Status: COMPLETED | OUTPATIENT
Start: 2017-08-13 | End: 2017-08-13

## 2017-08-13 NOTE — PROGRESS NOTE ADULT - SUBJECTIVE AND OBJECTIVE BOX
CC: No complaints No overnight events. ROS negative.    Vital Signs Last 24 Hrs  T(C): 36.7 (13 Aug 2017 08:42), Max: 36.7 (12 Aug 2017 19:04)  T(F): 98.1 (13 Aug 2017 08:42), Max: 98.1 (12 Aug 2017 19:04)  HR: 77 (13 Aug 2017 08:42) (74 - 89)  BP: 156/83 (13 Aug 2017 08:42) (104/64 - 156/83)  BP(mean): --  RR: 18 (13 Aug 2017 08:42) (18 - 19)  SpO2: 95% (13 Aug 2017 08:42) (95% - 98%)    PHYSICAL EXAMINATION  * General: Not in acute distress. Awake and alert. Lying comfortably in bed.  * Head: Normocephalic, atraumatic.  * HEENT: ears no discharge, eyes PERRLA, nose no discharge, throat no exudates, normal tonsils.  * Neck: no JVD, supple.  * Lungs: Clear to auscultation, no rales, no wheezes.  * Cardio: Regular rate and rhythm, no murmurs, no rubs, no gallops. Good peripheral pulses.  * Abdomen: Soft, non-tender, non-distended, tympanic to percussion, no rebound, no guarding, no rigidity. Bowel sounds present. No suprapubic or CVA tenderness.  * : Deferred.  * Extremities: Acyanotic, no edema.  * Skin: Warm and dry.  * Neuro: Alert and oriented x 3. No focal deficits. Motor strength is 5/5 throughout. Sensation intact. Cranial nerves II-XII grossly intact.                           12.7   4.5   )-----------( 175      ( 13 Aug 2017 08:47 )             36.4   08-13    139  |  102  |  25<H>  ----------------------------<  100<H>  4.6   |  28  |  1.10    Ca    9.0      13 Aug 2017 08:47  Mg     1.9     08-13    TPro  7.7  /  Alb  4.0  /  TBili  0.4  /  DBili  x   /  AST  25  /  ALT  13  /  AlkPhos  104  08-11    MEDICATIONS  (STANDING):  heparin  Injectable 5000 Unit(s) SubCutaneous every 8 hours    MEDICATIONS  (PRN):  acetaminophen   Tablet. 650 milliGRAM(s) Oral every 6 hours PRN Moderate Pain (4 - 6)

## 2017-08-14 ENCOUNTER — TRANSCRIPTION ENCOUNTER (OUTPATIENT)
Age: 82
End: 2017-08-14

## 2017-08-14 VITALS
OXYGEN SATURATION: 96 % | RESPIRATION RATE: 15 BRPM | DIASTOLIC BLOOD PRESSURE: 72 MMHG | HEART RATE: 81 BPM | TEMPERATURE: 98 F | SYSTOLIC BLOOD PRESSURE: 109 MMHG

## 2017-08-14 DIAGNOSIS — I50.22 CHRONIC SYSTOLIC (CONGESTIVE) HEART FAILURE: ICD-10-CM

## 2017-08-14 DIAGNOSIS — R53.81 OTHER MALAISE: ICD-10-CM

## 2017-08-14 LAB
ANION GAP SERPL CALC-SCNC: 13 MMOL/L — SIGNIFICANT CHANGE UP (ref 5–17)
BUN SERPL-MCNC: 30 MG/DL — HIGH (ref 7–23)
CALCIUM SERPL-MCNC: 8.9 MG/DL — SIGNIFICANT CHANGE UP (ref 8.4–10.5)
CHLORIDE SERPL-SCNC: 101 MMOL/L — SIGNIFICANT CHANGE UP (ref 96–108)
CO2 SERPL-SCNC: 25 MMOL/L — SIGNIFICANT CHANGE UP (ref 22–31)
CREAT SERPL-MCNC: 1.1 MG/DL — SIGNIFICANT CHANGE UP (ref 0.5–1.3)
GLUCOSE SERPL-MCNC: 96 MG/DL — SIGNIFICANT CHANGE UP (ref 70–99)
HCT VFR BLD CALC: 37.8 % — LOW (ref 39–50)
HGB BLD-MCNC: 13.3 G/DL — SIGNIFICANT CHANGE UP (ref 13–17)
MAGNESIUM SERPL-MCNC: 1.9 MG/DL — SIGNIFICANT CHANGE UP (ref 1.6–2.6)
MCHC RBC-ENTMCNC: 33.4 PG — SIGNIFICANT CHANGE UP (ref 27–34)
MCHC RBC-ENTMCNC: 35.2 G/DL — SIGNIFICANT CHANGE UP (ref 32–36)
MCV RBC AUTO: 95 FL — SIGNIFICANT CHANGE UP (ref 80–100)
PLATELET # BLD AUTO: 195 K/UL — SIGNIFICANT CHANGE UP (ref 150–400)
POTASSIUM SERPL-MCNC: 4.4 MMOL/L — SIGNIFICANT CHANGE UP (ref 3.5–5.3)
POTASSIUM SERPL-SCNC: 4.4 MMOL/L — SIGNIFICANT CHANGE UP (ref 3.5–5.3)
RBC # BLD: 3.98 M/UL — LOW (ref 4.2–5.8)
RBC # FLD: 12.7 % — SIGNIFICANT CHANGE UP (ref 10.3–16.9)
SODIUM SERPL-SCNC: 139 MMOL/L — SIGNIFICANT CHANGE UP (ref 135–145)
WBC # BLD: 6.2 K/UL — SIGNIFICANT CHANGE UP (ref 3.8–10.5)
WBC # FLD AUTO: 6.2 K/UL — SIGNIFICANT CHANGE UP (ref 3.8–10.5)

## 2017-08-14 PROCEDURE — 80053 COMPREHEN METABOLIC PANEL: CPT

## 2017-08-14 PROCEDURE — 93005 ELECTROCARDIOGRAM TRACING: CPT

## 2017-08-14 PROCEDURE — 99285 EMERGENCY DEPT VISIT HI MDM: CPT | Mod: 25

## 2017-08-14 PROCEDURE — 73700 CT LOWER EXTREMITY W/O DYE: CPT

## 2017-08-14 PROCEDURE — 99238 HOSP IP/OBS DSCHRG MGMT 30/<: CPT

## 2017-08-14 PROCEDURE — 80048 BASIC METABOLIC PNL TOTAL CA: CPT

## 2017-08-14 PROCEDURE — 85025 COMPLETE CBC W/AUTO DIFF WBC: CPT

## 2017-08-14 PROCEDURE — 70450 CT HEAD/BRAIN W/O DYE: CPT

## 2017-08-14 PROCEDURE — 85027 COMPLETE CBC AUTOMATED: CPT

## 2017-08-14 PROCEDURE — 36415 COLL VENOUS BLD VENIPUNCTURE: CPT

## 2017-08-14 PROCEDURE — 97161 PT EVAL LOW COMPLEX 20 MIN: CPT

## 2017-08-14 PROCEDURE — 73502 X-RAY EXAM HIP UNI 2-3 VIEWS: CPT

## 2017-08-14 PROCEDURE — 83735 ASSAY OF MAGNESIUM: CPT

## 2017-08-14 PROCEDURE — 93970 EXTREMITY STUDY: CPT

## 2017-08-14 PROCEDURE — 97116 GAIT TRAINING THERAPY: CPT

## 2017-08-14 PROCEDURE — 73562 X-RAY EXAM OF KNEE 3: CPT

## 2017-08-14 PROCEDURE — 93925 LOWER EXTREMITY STUDY: CPT
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
## 2017-08-14 NOTE — PROGRESS NOTE ADULT - PROBLEM SELECTOR PLAN 6
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 "many years ago". Patient denies taking any regular medications, has not been taking Aspirin or anticoagulants in years.  - ECHO from January showing annuloplasty ring is noted in the mitral position  - Patient supposed to follow up with Cardiology as outpatient but neglected to do so  - f/u w/Cardiology regarding anticoagulation/necessary medications  - given pt w/ multiple falls in the past, his unwillingness to take medication or follow up with a physician, pt is not a good candidate for anticoagulation. HAS-BLED score of 2.

## 2017-08-14 NOTE — CONSULT NOTE ADULT - ASSESSMENT
89yo M w/PMHx of OA and unspecified valvuloplasty who was BIBEMS after having a witnessed mechanical fall in the street w/o head trauma or LOC, with difficulty ambulating and carrying out tasks of daily living at home alone    Problem/Plan - 1:  ·  Problem: Fall, initial encounter.  Plan: Pt status post witnessed fall on the street. Negative CT head, xrays.   CT of R knee showes no acute fracture or dislocation. Large knee effusion with evidence of old hemorrhage. Severe osteoarthritis.

## 2017-08-14 NOTE — DISCHARGE NOTE ADULT - PATIENT PORTAL LINK FT
“You can access the [facility] Patient Portal, offered by [facility], by registering with the following website: [email or web address]”

## 2017-08-14 NOTE — PROGRESS NOTE ADULT - SUBJECTIVE AND OBJECTIVE BOX
Patient is a 89y old  Male who presents with a chief complaint of BIBEMS after fall (14 Aug 2017 11:15)      INTERVAL HPI/OVERNIGHT EVENTS:    Pt. seen and examined at 2:15PM  Pt. feels well, wants to go home  He denies pain, CP, SOB, palpitations  He refuses KRISSY placement - he verbalized understanding re: benefit of KRISSY, and risks of refusal; he is agreeable to home care services    Review of Systems: 12 point review of systems otherwise negative    MEDICATIONS  (STANDING):  heparin  Injectable 5000 Unit(s) SubCutaneous every 8 hours    MEDICATIONS  (PRN):  acetaminophen   Tablet. 650 milliGRAM(s) Oral every 6 hours PRN Moderate Pain (4 - 6)      Allergies    iodine containing compounds (Unknown)    Intolerances          Vital Signs Last 24 Hrs  T(C): 36.5 (14 Aug 2017 15:47), Max: 37 (14 Aug 2017 05:18)  T(F): 97.7 (14 Aug 2017 15:47), Max: 98.6 (14 Aug 2017 05:18)  HR: 81 (14 Aug 2017 15:47) (71 - 86)  BP: 109/72 (14 Aug 2017 15:47) (109/72 - 138/71)  BP(mean): --  RR: 15 (14 Aug 2017 15:47) (14 - 20)  SpO2: 96% (14 Aug 2017 15:47) (96% - 98%)  CAPILLARY BLOOD GLUCOSE          13 @ 07:  -   @ 07:00  --------------------------------------------------------  IN: 430 mL / OUT: 700 mL / NET: -270 mL     @ 07:  -   @ 17:02  --------------------------------------------------------  IN: 0 mL / OUT: 200 mL / NET: -200 mL        Physical Exam:  (at 2:15PM)  Daily     Daily Weight in k.8 (14 Aug 2017 11:15)  General:  well-appearing  HEENT:  MMM  CV:  no JVD  Lungs:  CTA B/L  Extremities:  chronic vascular ulcers  Skin:  WWP  Neuro:  AAOx3    LABS:                        13.3   6.2   )-----------( 195      ( 14 Aug 2017 08:21 )             37.8     08-14    139  |  101  |  30<H>  ----------------------------<  96  4.4   |  25  |  1.10    Ca    8.9      14 Aug 2017 08:21  Mg     1.9     14              RADIOLOGY & ADDITIONAL TESTS:    ---------------------------------------------------------------------------  I personally reviewed: [  ]EKG   [  ]CXR    [  ] CT    [  ]Other  ---------------------------------------------------------------------------  PLEASE CHECK WHEN PRESENT:     [  ]Heart Failure     [  ] Acute     [  ] Acute on Chronic     [  ] Chronic  -------------------------------------------------------------------     [  ]Diastolic [HFpEF]     [  ]Systolic [HFrEF]     [  ]Combined [HFpEF & HFrEF]     [  ]Other:  -------------------------------------------------------------------  [  ]JAK     [  ]ATN     [  ]Reneal Medullary Necrosis     [  ]Renal Cortical Necrosis     [  ]Other Pathological Lesions:    [  ]CKD 1  [  ]CKD 2  [  ]CKD 3  [  ]CKD 4  [  ]CKD 5  [  ]Other  -------------------------------------------------------------------  [  ]Other/Unspecified:    --------------------------------------------------------------------    Abdominal Nutritional Status  [  ]Malnutrition: See Nutrition Note  [  ]Cachexia  [  ]Other:   [  ]Supplement Ordered:  [  ]Morbid Obesity (BMI >=40]

## 2017-08-14 NOTE — DISCHARGE NOTE ADULT - HOSPITAL COURSE
Patient is a 89yo M who reports PMHx only significant for OA who was BIBEMS after having a witnessed fall in the street. Patient cannot remember the incident, but sites "memory problems". Per ED and EMS report, patient had witnessed fall with no head trauma or LOC. He lives in a three story walk-up building and uses a walker to ambulate outside, but props himself up on the walk to walk inside the apartment. Patient maintains he has no issues taking care of himself at home, but appears disheveled and with clothes that have not been changed in quite some time. Of note, patient was admitted in January for similar complaint when he had a fall at home and his super called EMS to bring him to the ED. Patient was supposed to follow up with [facility] and Cardiology at that time but neglected to do so, stating he believes in "natura" medicine. During his hospital stay pt had a venous duplex which did not show any DVTs. 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 Physical therapy recommends that the pt go to subacute rehab to improve his functional status. Upon discharge pt is hemodynamically stable. Patient is a 89yo M who reports PMHx only significant for OA who was BIBEMS after having a witnessed fall in the street. Patient cannot remember the incident, but sites "memory problems". Per ED and EMS report, patient had witnessed fall with no head trauma or LOC. He lives in a three story walk-up building and uses a walker to ambulate outside, but props himself up on the walk to walk inside the apartment. Patient maintains he has no issues taking care of himself at home, but appears disheveled and with clothes that have not been changed in quite some time. Of note, patient was admitted in January for similar complaint when he had a fall at home and his super called EMS to bring him to the ED. Patient was supposed to follow up with [facility] and Cardiology at that time but neglected to do so, stating he believes in "natura" medicine. During his hospital stay pt had a venous duplex which did not show any DVTs. 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 Physical therapy recommends that the pt go to subacute rehab to improve his functional status but the pt is refusing to go to rehab and would rather go home. The risks of not going to the rehab facility were explained and the pt understood the risks of not going. Pt will be discharged home and given that he has community medicaid will have home PT set up. Upon discharge pt is hemodynamically stable. Patient is a 89yo M who reports PMHx only significant for OA who was BIBEMS after having a witnessed fall in the street. Patient cannot remember the incident, but sites "memory problems". Per ED and EMS report, patient had witnessed fall with no head trauma or LOC. He lives in a three story walk-up building and uses a walker to ambulate outside, but props himself up on the walk to walk inside the apartment. Patient maintains he has no issues taking care of himself at home, but appears disheveled and with clothes that have not been changed in quite some time. Of note, patient was admitted in January for similar complaint when he had a fall at home and his super called EMS to bring him to the ED. Patient was supposed to follow up with [facility] and Cardiology at that time but neglected to do so, stating he believes in "natura" medicine. During his hospital stay pt had a venous duplex which did not show any DVTs. 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 Physical therapy recommends that the pt go to subacute rehab to improve his functional status but the pt is refusing to go to rehab and would rather go home. Although this would be deemed an unsafe discharge pt has full capacity and is able to understand the risks of not going to rehab and going home. Social work will be working to help patient get physical therapy at home given that he qualifies for community medicaid. Upon discharge pt is hemodynamically stable.

## 2017-08-14 NOTE — PROGRESS NOTE ADULT - SUBJECTIVE AND OBJECTIVE BOX
INTERVAL HPI/OVERNIGHT EVENTS:    VITAL SIGNS:  T(F): 98.6 (08-14-17 @ 05:18)  HR: 76 (08-14-17 @ 05:18)  BP: 128/78 (08-14-17 @ 05:18)  RR: 20 (08-14-17 @ 05:18)  SpO2: 98% (08-14-17 @ 05:18)  Wt(kg): --    PHYSICAL EXAM:  General: No acute distress.   HEENT: Normocephalic, Atraumtic. PEERL. MMM  Respiratory: Normal breath sounds b/l. No wheezes, crackles, rhonchi.  Cardiovascular: Normal S1 and S2. RRR. No rubs, murmurs.   Gastrointestinal: Soft, non-distended, non-tender. Positive bowel sounds.   Extremities: No LE edema.   Skin: No rashes or ulcers. No clubbing/cyanosis.   Vascular: 2+ dorsalis pedis pulses b/l  Neurological: No focal deficits. AAOx3.       MEDICATIONS  (STANDING):  heparin  Injectable 5000 Unit(s) SubCutaneous every 8 hours    MEDICATIONS  (PRN):  acetaminophen   Tablet. 650 milliGRAM(s) Oral every 6 hours PRN Moderate Pain (4 - 6)      Allergies    iodine containing compounds (Unknown)    Intolerances        LABS:                        12.7   4.5   )-----------( 175      ( 13 Aug 2017 08:47 )             36.4     08-13    139  |  102  |  25<H>  ----------------------------<  100<H>  4.6   |  28  |  1.10    Ca    9.0      13 Aug 2017 08:47  Mg     1.9     08-13            RADIOLOGY & ADDITIONAL TESTS: INTERVAL HPI/OVERNIGHT EVENTS: No acute events overnight. Pt seen and examined this morning at bedside. Pt reports that he is feeling fine. Was not able to recall who I was. Denies chest pain, sob, headache, abdominal pain, n/v, diarrhea/constipation.     VITAL SIGNS:  T(F): 98.6 (08-14-17 @ 05:18)  HR: 76 (08-14-17 @ 05:18)  BP: 128/78 (08-14-17 @ 05:18)  RR: 20 (08-14-17 @ 05:18)  SpO2: 98% (08-14-17 @ 05:18)  Wt(kg): --    PHYSICAL EXAM:  General: No acute distress.   HEENT: Normocephalic, Atraumtic. PEERL. MMM  Respiratory: Normal breath sounds b/l. No wheezes, crackles, rhonchi.  Cardiovascular: Normal S1 and S2. RRR. No rubs, murmurs.   Gastrointestinal: Soft, non-distended, non-tender. Positive bowel sounds.   Extremities: No LE edema.   Skin: No rashes or ulcers. No clubbing/cyanosis.   Vascular: 2+ dorsalis pedis pulses b/l  Neurological: No focal deficits. AAOx3.       MEDICATIONS  (STANDING):  heparin  Injectable 5000 Unit(s) SubCutaneous every 8 hours    MEDICATIONS  (PRN):  acetaminophen   Tablet. 650 milliGRAM(s) Oral every 6 hours PRN Moderate Pain (4 - 6)      Allergies    iodine containing compounds (Unknown)    Intolerances        LABS:                        12.7   4.5   )-----------( 175      ( 13 Aug 2017 08:47 )             36.4     08-13    139  |  102  |  25<H>  ----------------------------<  100<H>  4.6   |  28  |  1.10    Ca    9.0      13 Aug 2017 08:47  Mg     1.9     08-13            RADIOLOGY & ADDITIONAL TESTS:

## 2017-08-14 NOTE — DISCHARGE NOTE ADULT - MEDICATION SUMMARY - MEDICATIONS TO TAKE
I will START or STAY ON the medications listed below when I get home from the hospital:    walker   -- Apply on skin to affected area once a day    dispense 1 rolling walker     ht: 5'9"  wt 155 lbs  -- Indication: For To help you walk

## 2017-08-14 NOTE — DISCHARGE NOTE ADULT - CARE PROVIDER_API CALL
Amy Dey (), [facility]  [address] [facility]  [address] [address]  Phone: [phone]  Fax: [phone]    Jose Tristan), Internal Medicine  [address] [address]  Phone: [phone]  Fax: [phone] Jose Tristan), Internal Medicine  [address] [address]  Phone: [phone]  Fax: [phone]    Catalina Ruby), Internal Medicine  [address]  [address] [place] [address]  Phone: [phone]  Fax: [phone]

## 2017-08-14 NOTE — PROGRESS NOTE ADULT - PROBLEM SELECTOR PROBLEM 3
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
Chronic ulcer of ankle, right, with unspecified severity

## 2017-08-14 NOTE — DISCHARGE NOTE ADULT - CARE PLAN
Principal Discharge DX:	Fall, initial encounter  Goal:	To improve your functional status.  Secondary Diagnosis:	History of heart valve replacement  Secondary Diagnosis:	Osteoarthritis Principal Discharge DX:	Fall, initial encounter  Goal:	To improve your functional status.  Instructions for follow-up, activity and diet:	You came in after you were found down in the street. You had a CT of your head which did not reveal any acute bleeds. Physical therapy was able to work with you and they recommended that you go to subacute rehab to help improve your functional status.  Secondary Diagnosis:	History of heart valve replacement  Secondary Diagnosis:	Osteoarthritis Principal Discharge DX:	Fall, initial encounter  Goal:	To improve your functional status.  Instructions for follow-up, activity and diet:	You came in after you were found down in the street. You had a CT of your head which did not reveal any acute bleeds. Physical therapy was able to work with you and they recommended that you go to subacute rehab to help improve your functional status. 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 A venous duplex did not show any blood clots in the veins of your legs.  Secondary Diagnosis:	History of heart valve replacement  Instructions for follow-up, activity and diet:	You had a mitral valve replacement several years ago. You are currently not on any medications for your heart. You were explained the risks of not taking your heart medications and your understood the risks.  Secondary Diagnosis:	Osteoarthritis  Instructions for follow-up, activity and diet:	You have a history of osteoarthritis of your knees. This is attributing to you being a fall risk. Principal Discharge DX:	Fall, initial encounter  Goal:	To improve your functional status.  Instructions for follow-up, activity and diet:	You came in after you were found down in the street. You had a CT of your head which did not reveal any acute bleeds. Physical therapy was able to work with you and they recommended that you go to subacute rehab to help improve your functional status. You refused to go to rehab. You were explained the risks of not going and you understood. 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 A venous duplex did not show any blood clots in the veins of your legs.  Secondary Diagnosis:	History of heart valve replacement  Instructions for follow-up, activity and diet:	You had a mitral valve replacement several years ago. You are currently not on any medications for your heart. You were explained the risks of not taking your heart medications and your understood the risks.  Secondary Diagnosis:	Osteoarthritis  Instructions for follow-up, activity and diet:	You have a history of osteoarthritis of your knees. This is attributing to you being a fall risk.

## 2017-08-14 NOTE — PROGRESS NOTE ADULT - ATTENDING COMMENTS
Dispo: medically-clear for d/c pending safe plan; Pt. refuses KRISSY
For KRISSY. Medically ready.
Patient was seen and examined by me at bedside. I agree with resident's note, subjective, objective physical exam, assessment and plan with following modifications/additions.    1) Unable to ambulate  2) Mechanical fall  3) Anemia chronic disease  4) Leucopenia  5) HFrEF, chornic  6) Mitral valve replacement.    Plan: Needs PT evaluation and SW evaluation for DC.

## 2017-08-14 NOTE — PROGRESS NOTE ADULT - PROBLEM SELECTOR PLAN 3
Patient with ulceration 3cm x 3cm on the RLE with significant scabbing, dry skin and erythema suggestive of chronic vascular disease. No warmth, induration, fluctuance, no drainage or purulence.   - Wound care
Patient with ulceration 3cm x 3cm on the RLE with significant scabbing, dry skin and erythema suggestive of chronic vascular disease. No warmth, induration, fluctuance, no drainage or purulence.   - Wound care  - f/u Arterial duplex b/l LE
Wound care.
d/t PVD; chronic, no e/o infection; cont. wound care, outpatient Vascular f/u

## 2017-08-14 NOTE — PROGRESS NOTE ADULT - PROBLEM SELECTOR PROBLEM 4
History of heart valve replacement
Leg edema, right
Leg edema, right
Chronic systolic CHF (congestive heart failure)

## 2017-08-14 NOTE — PROGRESS NOTE ADULT - PROBLEM SELECTOR PLAN 2
* KRISSY placement  * SW consult.
Patient is 89yo  male who lives alone at home with no family, no HHA, ambulating with walker and living in a three story walk up, found down by EMS after witnessed mechanical fall with no head trauma or LOC, CT head negative, xray of hips and knees negative for fracture.  - SW eval  - Physical therapy recommends KRISSY  - Safe discharge planning
Patient is 89yo  male who lives alone at home with no family, no HHA, ambulating with walker and living in a three story walk up, found down by EMS after witnessed mechanical fall with no head trauma or LOC, CT head negative, xray of hips and knees negative for fracture.  - SW eval  - PT eval  - Safe discharge planning
of knees and hips; cont. Tylenol PRN, PT as above

## 2017-08-14 NOTE — DISCHARGE NOTE ADULT - CARE PROVIDERS DIRECT ADDRESSES
[email or web address]

## 2017-08-14 NOTE — DISCHARGE NOTE ADULT - INSTRUCTIONS
Continue with a well-balanced diet that is low in salt and cholesterol to aide your heart health See your doctor. Report  any pain, shortness of breath, or any unusual symptoms.

## 2017-08-14 NOTE — DISCHARGE NOTE ADULT - PLAN OF CARE
To improve your functional status. You came in after you were found down in the street. You had a CT of your head which did not reveal any acute bleeds. Physical therapy was able to work with you and they recommended that you go to subacute rehab to help improve your functional status. You had a mitral valve replacement several years ago. You are currently not on any medications for your heart. You were explained the risks of not taking your heart medications and your understood the risks. You have a history of osteoarthritis of your knees. This is attributing to you being a fall risk. You came in after you were found down in the street. You had a CT of your head which did not reveal any acute bleeds. Physical therapy was able to work with you and they recommended that you go to subacute rehab to help improve your functional status. 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 A venous duplex did not show any blood clots in the veins of your legs. You came in after you were found down in the street. You had a CT of your head which did not reveal any acute bleeds. Physical therapy was able to work with you and they recommended that you go to subacute rehab to help improve your functional status. You refused to go to rehab. You were explained the risks of not going and you understood. 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 A venous duplex did not show any blood clots in the veins of your legs.

## 2017-08-14 NOTE — CONSULT NOTE ADULT - SUBJECTIVE AND OBJECTIVE BOX
Patient is a 89y old  Male who presents with a chief complaint of BIBEMS after fall (11 Aug 2017 19:03)      HPI:  Patient is a 89yo M who reports PMHx only significant for OA who was BIBEMS after having a witnessed fall in the street. Patient cannot remember the incident, but sites "memory problems". Per ED and EMS report, patient had witnessed fall with no head trauma or LOC. Patient denies any current pain aside from the chronic pain he has in the b/l knees, states he takes no medications, and hasn't seen a doctor in years. He moved to [place] with his wife after their children left [place], though she  in the . He has since lived alone with no close family and no HHA. He lives in a three story walk-up building and uses a walker to ambulate outside, but props himself up on the walk to walk inside the apartment. Patient maintains he has no issues taking care of himself at home, but appears disheveled and with clothes that have not been changed in quite some time. Of note, patient was admitted in January for similar complaint when he had a fall at home and his super called EMS to bring him to the ED. Patient was supposed to follow up with [facility] and Cardiology at that time but neglected to do so, stating he believes in "natura" medicine. Patient otherwise denies complaints. Denies fevers, chills, CP, palpitations, SOB, cough, abdominal pain, n/v/d/c, dysuria, LE edema, orthopnea, PND.    In the ED, VS T 97.5, HR 82, /95, R 18, SpO2 95%. Labs showing WBC of 5.4 without neutrophilia, Hgb 12.9 w/baseline ~11.5, Cr 1.10 (baseline 1.01). CT head showing no evidence of CVA, only parenchymal volume loss and microangiopathic white matter ischemic changes. X-rays of the hip and knees were negative for fracture, only showing evidence of OA. Patient was given a 1L NS bolus and admitted to Medicine. (11 Aug 2017 19:03)      PAST MEDICAL & SURGICAL HISTORY:  Osteoarthritis  History of heart valve replacement: Mitral valve replaced  History of heart valve replacement: Mitral valve replaced      MEDICATIONS  (STANDING):  heparin  Injectable 5000 Unit(s) SubCutaneous every 8 hours    MEDICATIONS  (PRN):  acetaminophen   Tablet. 650 milliGRAM(s) Oral every 6 hours PRN Moderate Pain (4 - 6)      Social History:  since , lives alone in a 3 flight walkup apartment, no home care services    Functional Level Prior to Admission: reports ADL independent, walks with a rolling walker    FAMILY HISTORY:  No pertinent family history      CBC Full  -  ( 14 Aug 2017 08:21 )  WBC Count : 6.2 K/uL  Hemoglobin : 13.3 g/dL  Hematocrit : 37.8 %  Platelet Count - Automated : 195 K/uL  Mean Cell Volume : 95.0 fL  Mean Cell Hemoglobin : 33.4 pg  Mean Cell Hemoglobin Concentration : 35.2 g/dL  Auto Neutrophil # : x  Auto Lymphocyte # : x  Auto Monocyte # : x  Auto Eosinophil # : x  Auto Basophil # : x  Auto Neutrophil % : x  Auto Lymphocyte % : x  Auto Monocyte % : x  Auto Eosinophil % : x  Auto Basophil % : x      08-    139  |  102  |  25<H>  ----------------------------<  100<H>  4.6   |  28  |  1.10    Ca    9.0      13 Aug 2017 08:47  Mg     1.9                   Radiology:    < from: CT Head No Cont (17 @ 15:22) >  EXAM:  CT BRAIN                          PROCEDURE DATE:  2017                     INTERPRETATION:  PROCEDURE: CT head without contrast.    INDICATION: Status post fall, confusion.    TECHNIQUE: Multiple axial sections were obtained at 5 mm intervals. The   images were reviewed in brain and bone windows. Imaging is performed   using helical low-dose technique, and sagittal and coronal reformations   are provided.    COMPARISON: None    FINDINGS: The CT examination demonstrates moderate generalized volume   loss as manifested by the enlargement of the ventricles, cisternal   spaces, and cortical sulci throughout.     There is no acute intracranial hemorrhage, mass effect, midline shift or   extra axial collections.     The gray white differentiation appears grossly preserved without evidence   for an acute transcortical infarction.     There is confluent periventricular white matter lucency, likely the   sequela of moderate to severe small vessel ischemic disease.     The bony windows demonstrates no fractures. The included paranasal   sinuses and mastoid air cells are predominantly clear.    IMPRESSION:     1. No acute intracranial hemorrhage, mass effect or CT evidence of a   recent transcortical infarction.    2. Moderate parenchymal volume loss and moderate-severe microangiopathic   white matter ischemic change.      < end of copied text >      < from: CT Knee No Cont, Right (17 @ 17:33) >    EXAM:  CT KNEE ONLY RT                          PROCEDURE DATE:  2017                     INTERPRETATION:  CT scan of the right knee without contrast    Clinical indication: Knee pain    Technique: Noncontrast axial imaging of the right knee was performed.   Data was reformatted to sagittal and coronal planes.    Findings: As on radiographs performed 2017 there is advanced   medial compartment joint space narrowing subchondral sclerosis and cystic   change. To lesser extent similar findings are present in the lateral and   patellofemoral compartments. Marginal osteophytes are present. There is a   a heterogeneous joint effusion containing calcified debris. Calcification   the menisci are present as well. The synovium appears thickened. No   fracture is identified.    Evaluation of the remaining soft tissues is limited by the inherent soft   tissue contrast of CT. Within this limitation no intramuscular hematoma   is present. Atherosclerotic calcifications are noted.    Impression: Right knee arthropathy without evidence of a fracture. The   presence of calcification in the effusion and chondrocalcinosis   crystal-induced arthropathy may be considered.        < end of copied text >        < from: Xray Hip w/ Pelvis 2 or 3 Views, Right (17 @ 15:13) >  EXAM:  XR HIP WITH PELV 2-3V RT                          PROCEDURE DATE:  2017                     INTERPRETATION:  Indication: Pain after fall    Single view the pelvis and 2 views of the right hip have been submitted.   There is osteopenia.On the AP view there is a subtle lucency in the   basicervical aspect of the femur. This appears to extend beyond the   femoral cortex superiorly and is likely a shadow. No corresponding   abnormal identified on additional projections. Stool is notedin the   rectum and colon.      Impression: No fracture is identified. If there is persistent concern for   occult fracture and MRI should be considered.        < end of copied text >      < from: Xray Knee 3 Views, Right (17 @ 15:13) >  EXAM:  XR KNEE-RIGHT-3 VIEWS                          PROCEDURE DATE:  2017                     INTERPRETATION:  Indication: pain after fall    3 views of the right knee demonstrate varus angulation with   osteoarthritis. There is a joint effusion. No acute fracture or   dislocation is identified. Vascular calcifications are noted.      Impression: Osteoarthritis of the right knee        < end of copied text >      Vital Signs Last 24 Hrs  T(C): 37 (14 Aug 2017 05:18), Max: 37 (14 Aug 2017 05:18)  T(F): 98.6 (14 Aug 2017 05:18), Max: 98.6 (14 Aug 2017 05:18)  HR: 76 (14 Aug 2017 05:18) (71 - 77)  BP: 128/78 (14 Aug 2017 05:18) (109/68 - 128/78)  BP(mean): --  RR: 20 (14 Aug 2017 05:18) (18 - 20)  SpO2: 98% (14 Aug 2017 05:18) (96% - 98%)    REVIEW OF SYSTEMS:    CONSTITUTIONAL: fatigue  EYES: No eye pain, visual disturbances, or discharge  ENMT:  No difficulty hearing, tinnitus, vertigo; No sinus or throat pain  NECK: No pain or stiffness  BREASTS: No pain, masses, or nipple discharge  RESPIRATORY: No cough, wheezing, chills or hemoptysis; No shortness of breath  CARDIOVASCULAR: No chest pain, palpitations, dizziness, or leg swelling  GASTROINTESTINAL: No abdominal or epigastric pain. No nausea, vomiting, or hematemesis; No diarrhea or constipation. No melena or hematochezia.  GENITOURINARY: No dysuria, frequency, hematuria, or incontinence  NEUROLOGICAL: No headaches, memory loss, loss of strength, numbness, or tremors  SKIN: No itching, burning, rashes, or lesions   LYMPH NODES: No enlarged glands  ENDOCRINE: No heat or cold intolerance; No hair loss  MUSCULOSKELETAL: low back and right knee pain  PSYCHIATRIC: No depression, anxiety, mood swings, or difficulty sleeping  HEME/LYMPH: No easy bruising, or bleeding gums  ALLERGY AND IMMUNOLOGIC: No hives or eczema      Physical Exam: dishevelled, elderly appearing  gentleman lying in bed, c/o right knee and low back pain    HEENT: normocephalic/ atraumatic, anicteric    Neck: supple, negative JVD, negative carotid bruits,    Chest: CTA bilaterally, neg wheeze, rhonchi, rales, crackles, egophany    Cardiovascular: regular rate and rhythm, II/VI AMMY    Abdomen: soft, non distended, non tender, negative rebound/guarding, normal bowel sounds, neg hepatosplenomegaly    Extremities: 1+ LE edema, negative calf tenderness to palpation, negative Tera's sign    Right knee: pos effusion, limited ROM 0-80 degrees with crepitus, medial>lateral joint line tenderness, RLE ulcer: no purulence,     Spine: neg ecchymosis/ lesions/rash, neg spasms, jack L2-4 paraspinal tenderness, neg straight leg raise    :     Neurologic Exam:    Alert and oriented x 3 , speech fluent w/o dysarthria,     Cranial Nerves:     II:                       Left pupil fixed, visual fields intact   III/ IV/VI:             extraocular movements intact, neg nystagmus, ptosis  V:                      facial sensation intact, V1-3 normal  VII:                     face symmetric, no droop, normal eye closure and smile  VIII:                    hearing intact to finger rub bilaterally  IX/ X:                  soft palate rise symmetrical  XI:                      head turning, shoulder shrug normal  XII:                     tongue midline    Motor Exam:    Bilateral UE:        4/5 /intrinsics                            4+/5 biceps/triceps/wrist extensors-flexors/deltoid                            negative pronator drift      Bilateral LE:        4-/5 hip flexors/adductors/abductors                            4/5 quadriceps/hamstrings                            5/5 dorsiflexors/plantar flexors/invertors-evertors    Sensory: intact to LT/PP in all UE/LE dermatomes    DTR:        = biceps/     triceps/     brachioradialis                 = patella/   medial hamstring/    ankle                 neg clonus                 neg Babinski                 neg Hoffmans      Romberg: NT    Tandem Walking: NT    Gait:  NT        PM&R Impression:    1) s/p mechanical fall  2) R knee OA  3) Lumbar myofascial pain  4) deconditioned  5) gait dysfunction      Recommendations:      1) Physical therapy focusing on therapeutic exercises, bed mobility/transfer out of bed evaluation, progressive ambulation with assistive devices.    2) Disposition Plan: recommend subacute rehab placement

## 2017-08-14 NOTE — PROGRESS NOTE ADULT - PROBLEM SELECTOR PLAN 1
Pt status post witnessed fall on the street. Negative CT head, xrays.   CT of R knee showes no acute fracture or dislocation. Large knee effusion with evidence of old hemorrhage. Severe osteoarthritis.  -Physical therapy recommends [facility].

## 2017-08-16 DIAGNOSIS — M17.0 BILATERAL PRIMARY OSTEOARTHRITIS OF KNEE: ICD-10-CM
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
## 2017-08-16 DIAGNOSIS — Z91.81 HISTORY OF FALLING: ICD-10-CM

## 2017-08-16 DIAGNOSIS — Z95.2 PRESENCE OF PROSTHETIC HEART VALVE: ICD-10-CM

## 2017-08-16 DIAGNOSIS — L97.312 NON-PRESSURE CHRONIC ULCER OF RIGHT ANKLE WITH FAT LAYER EXPOSED: ICD-10-CM

## 2017-08-16 DIAGNOSIS — R41.3 OTHER AMNESIA: ICD-10-CM

## 2017-08-16 DIAGNOSIS — M25.461 EFFUSION, RIGHT KNEE: ICD-10-CM

## 2017-08-16 DIAGNOSIS — I50.22 CHRONIC SYSTOLIC (CONGESTIVE) HEART FAILURE: ICD-10-CM

## 2017-08-16 DIAGNOSIS — D63.8 ANEMIA IN OTHER CHRONIC DISEASES CLASSIFIED ELSEWHERE: ICD-10-CM
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
## 2017-08-16 DIAGNOSIS — D72.819 DECREASED WHITE BLOOD CELL COUNT, UNSPECIFIED: ICD-10-CM

## 2017-08-16 DIAGNOSIS — I73.9 PERIPHERAL VASCULAR DISEASE, UNSPECIFIED: ICD-10-CM

## 2017-08-16 DIAGNOSIS — Z91.013 ALLERGY TO SEAFOOD: ICD-10-CM

## 2018-01-01 ENCOUNTER — INPATIENT (INPATIENT)
Facility: HOSPITAL | Age: 83
LOS: 2 days | Discharge: NURSING, REHAB OR LONG-TERM CARE | DRG: 884 | End: 2018-01-04
Attending: STUDENT IN AN ORGANIZED HEALTH CARE EDUCATION/TRAINING PROGRAM | Admitting: STUDENT IN AN ORGANIZED HEALTH CARE EDUCATION/TRAINING PROGRAM
Payer: MEDICARE

## 2018-01-01 VITALS
HEART RATE: 96 BPM | TEMPERATURE: 98 F | SYSTOLIC BLOOD PRESSURE: 177 MMHG | DIASTOLIC BLOOD PRESSURE: 97 MMHG | RESPIRATION RATE: 18 BRPM | OXYGEN SATURATION: 92 % | WEIGHT: 141.98 LBS

## 2018-01-01 DIAGNOSIS — D64.9 ANEMIA, UNSPECIFIED: ICD-10-CM

## 2018-01-01 DIAGNOSIS — R44.3 HALLUCINATIONS, UNSPECIFIED: ICD-10-CM

## 2018-01-01 DIAGNOSIS — R63.8 OTHER SYMPTOMS AND SIGNS CONCERNING FOOD AND FLUID INTAKE: ICD-10-CM

## 2018-01-01 DIAGNOSIS — Z95.2 PRESENCE OF PROSTHETIC HEART VALVE: ICD-10-CM

## 2018-01-01 DIAGNOSIS — Z29.9 ENCOUNTER FOR PROPHYLACTIC MEASURES, UNSPECIFIED: ICD-10-CM

## 2018-01-01 DIAGNOSIS — I50.9 HEART FAILURE, UNSPECIFIED: ICD-10-CM

## 2018-01-01 DIAGNOSIS — L97.919 NON-PRESSURE CHRONIC ULCER OF UNSPECIFIED PART OF RIGHT LOWER LEG WITH UNSPECIFIED SEVERITY: ICD-10-CM

## 2018-01-01 DIAGNOSIS — F03.91 UNSPECIFIED DEMENTIA WITH BEHAVIORAL DISTURBANCE: ICD-10-CM

## 2018-01-01 DIAGNOSIS — R41.0 DISORIENTATION, UNSPECIFIED: ICD-10-CM

## 2018-01-01 DIAGNOSIS — Z02.9 ENCOUNTER FOR ADMINISTRATIVE EXAMINATIONS, UNSPECIFIED: ICD-10-CM

## 2018-01-01 DIAGNOSIS — M19.91 PRIMARY OSTEOARTHRITIS, UNSPECIFIED SITE: ICD-10-CM

## 2018-01-01 DIAGNOSIS — F03.90 UNSPECIFIED DEMENTIA WITHOUT BEHAVIORAL DISTURBANCE: ICD-10-CM

## 2018-01-01 LAB
ALBUMIN SERPL ELPH-MCNC: 4 G/DL — SIGNIFICANT CHANGE UP (ref 3.3–5)
ALP SERPL-CCNC: 80 U/L — SIGNIFICANT CHANGE UP (ref 40–120)
ALT FLD-CCNC: 12 U/L — SIGNIFICANT CHANGE UP (ref 10–45)
ANION GAP SERPL CALC-SCNC: 12 MMOL/L — SIGNIFICANT CHANGE UP (ref 5–17)
APAP SERPL-MCNC: <15 UG/ML — SIGNIFICANT CHANGE UP (ref 10–30)
APPEARANCE UR: CLEAR — SIGNIFICANT CHANGE UP
AST SERPL-CCNC: 22 U/L — SIGNIFICANT CHANGE UP (ref 10–40)
BASOPHILS NFR BLD AUTO: 0.4 % — SIGNIFICANT CHANGE UP (ref 0–2)
BILIRUB SERPL-MCNC: 0.4 MG/DL — SIGNIFICANT CHANGE UP (ref 0.2–1.2)
BILIRUB UR-MCNC: NEGATIVE — SIGNIFICANT CHANGE UP
BUN SERPL-MCNC: 20 MG/DL — SIGNIFICANT CHANGE UP (ref 7–23)
CALCIUM SERPL-MCNC: 9.5 MG/DL — SIGNIFICANT CHANGE UP (ref 8.4–10.5)
CHLORIDE SERPL-SCNC: 100 MMOL/L — SIGNIFICANT CHANGE UP (ref 96–108)
CO2 SERPL-SCNC: 29 MMOL/L — SIGNIFICANT CHANGE UP (ref 22–31)
COLOR SPEC: YELLOW — SIGNIFICANT CHANGE UP
CREAT SERPL-MCNC: 0.98 MG/DL — SIGNIFICANT CHANGE UP (ref 0.5–1.3)
DIFF PNL FLD: (no result)
EOSINOPHIL NFR BLD AUTO: 0.7 % — SIGNIFICANT CHANGE UP (ref 0–6)
ETHANOL SERPL-MCNC: <10 MG/DL — SIGNIFICANT CHANGE UP (ref 0–10)
GLUCOSE SERPL-MCNC: 106 MG/DL — HIGH (ref 70–99)
GLUCOSE UR QL: NEGATIVE — SIGNIFICANT CHANGE UP
HCT VFR BLD CALC: 37.3 % — LOW (ref 39–50)
HGB BLD-MCNC: 12.5 G/DL — LOW (ref 13–17)
KETONES UR-MCNC: NEGATIVE — SIGNIFICANT CHANGE UP
LEUKOCYTE ESTERASE UR-ACNC: NEGATIVE — SIGNIFICANT CHANGE UP
LYMPHOCYTES # BLD AUTO: 15.9 % — SIGNIFICANT CHANGE UP (ref 13–44)
MCHC RBC-ENTMCNC: 32 PG — SIGNIFICANT CHANGE UP (ref 27–34)
MCHC RBC-ENTMCNC: 33.5 G/DL — SIGNIFICANT CHANGE UP (ref 32–36)
MCV RBC AUTO: 95.4 FL — SIGNIFICANT CHANGE UP (ref 80–100)
MONOCYTES NFR BLD AUTO: 10.2 % — SIGNIFICANT CHANGE UP (ref 2–14)
NEUTROPHILS NFR BLD AUTO: 72.8 % — SIGNIFICANT CHANGE UP (ref 43–77)
NITRITE UR-MCNC: NEGATIVE — SIGNIFICANT CHANGE UP
PCP SPEC-MCNC: SIGNIFICANT CHANGE UP
PH UR: 6 — SIGNIFICANT CHANGE UP (ref 5–8)
PLATELET # BLD AUTO: 207 K/UL — SIGNIFICANT CHANGE UP (ref 150–400)
POTASSIUM SERPL-MCNC: 3.8 MMOL/L — SIGNIFICANT CHANGE UP (ref 3.5–5.3)
POTASSIUM SERPL-SCNC: 3.8 MMOL/L — SIGNIFICANT CHANGE UP (ref 3.5–5.3)
PROT SERPL-MCNC: 7.5 G/DL — SIGNIFICANT CHANGE UP (ref 6–8.3)
PROT UR-MCNC: NEGATIVE MG/DL — SIGNIFICANT CHANGE UP
RBC # BLD: 3.91 M/UL — LOW (ref 4.2–5.8)
RBC # FLD: 13.1 % — SIGNIFICANT CHANGE UP (ref 10.3–16.9)
SALICYLATES SERPL-MCNC: <0.3 MG/DL — LOW (ref 2.8–20)
SODIUM SERPL-SCNC: 141 MMOL/L — SIGNIFICANT CHANGE UP (ref 135–145)
SP GR SPEC: 1.01 — SIGNIFICANT CHANGE UP (ref 1–1.03)
TSH SERPL-MCNC: 2.18 UIU/ML — SIGNIFICANT CHANGE UP (ref 0.35–4.94)
UROBILINOGEN FLD QL: 0.2 E.U./DL — SIGNIFICANT CHANGE UP
WBC # BLD: 4.5 K/UL — SIGNIFICANT CHANGE UP (ref 3.8–10.5)
WBC # FLD AUTO: 4.5 K/UL — SIGNIFICANT CHANGE UP (ref 3.8–10.5)

## 2018-01-01 PROCEDURE — 99285 EMERGENCY DEPT VISIT HI MDM: CPT

## 2018-01-01 PROCEDURE — 70450 CT HEAD/BRAIN W/O DYE: CPT | Mod: 26

## 2018-01-01 PROCEDURE — 99223 1ST HOSP IP/OBS HIGH 75: CPT | Mod: GC

## 2018-01-01 RX ORDER — ACETAMINOPHEN 500 MG
650 TABLET ORAL EVERY 6 HOURS
Qty: 0 | Refills: 0 | Status: DISCONTINUED | OUTPATIENT
Start: 2018-01-01 | End: 2018-01-04

## 2018-01-01 RX ORDER — HEPARIN SODIUM 5000 [USP'U]/ML
5000 INJECTION INTRAVENOUS; SUBCUTANEOUS EVERY 12 HOURS
Qty: 0 | Refills: 0 | Status: DISCONTINUED | OUTPATIENT
Start: 2018-01-01 | End: 2018-01-04

## 2018-01-01 NOTE — H&P ADULT - PROBLEM SELECTOR PROBLEM 6
Nutrition, metabolism, and development symptoms Prophylactic measure History of heart valve replacement

## 2018-01-01 NOTE — ED BEHAVIORAL HEALTH ASSESSMENT NOTE - AXIS IV
Problems with primary support/Problem related to social environment/Problems with access to healthcare services

## 2018-01-01 NOTE — ED ADULT NURSE NOTE - CHIEF COMPLAINT QUOTE
EMS states "his neighbor heard him screaming and called 911. When we got there, he was telling us that a 4-year old was running around in his house, which wasn't true."

## 2018-01-01 NOTE — ED ADULT TRIAGE NOTE - OTHER COMPLAINTS
Pt C/O pain to right ankle and right wrist. Old ulcer noted to right ankle. Pt denies fall nor any head injury. Pt is A&O x 3 at present.

## 2018-01-01 NOTE — H&P ADULT - PROBLEM SELECTOR PLAN 3
-5cm x5cm ulceration on RLE medial aspect angle medial malleolus with dry skin, scabbing suggestive of chronic vascular insufficiency. No warmth, induration, drainage or purulence  -f/u wound care -5cm x5cm ulceration on RLE medial aspect angle medial malleolus with dry skin, scabbing suggestive of chronic vascular insufficiency. No warmth, induration, drainage or purulence  -consider wound care in AM

## 2018-01-01 NOTE — H&P ADULT - PROBLEM SELECTOR PLAN 7
-PT evaluation  -SW evaluation F: none  E: replete as needed  N: DASH/TLC diet DVT: heparin  GI: no indication

## 2018-01-01 NOTE — ED ADULT NURSE NOTE - OBJECTIVE STATEMENT
Patient reports, "I screamed because I was frightened.  I saw a young child, she is 3 years old and an older man should not be around her."  Reports he has been seeing this child in the house for months and that she does not talk.  Patient reports wife had passed away about 20 years ago and lives alone, never had any children.  [facility] support [facility] that visits every so often.  Reports there is no food in the house.  Hygiene is poor, clothing soiled, sneakers with holes in them.  Mucous membranes moist, thin, little body fat, white sclera.  Patient reports sometimes people bring him foods, interested in home health services if offered.  Patient has no physical complaint, uneasy on feet without assistive devices.  Guarded when asked to change into a gown, cooperative to lab work and EKG.  Pending CT scan.  1:1 not indicated at current time.

## 2018-01-01 NOTE — ED PROVIDER NOTE - ATTENDING CONTRIBUTION TO CARE
89M presents after was yelling in apartment, with visual hallucinations. pt medical cleared, seen by psych who felt sx due to dementia and they also state pt does not have capacity to sign out AMA. Pt a social admit to medicine, unsafe DC. Place on 1:1.

## 2018-01-01 NOTE — ED BEHAVIORAL HEALTH ASSESSMENT NOTE - DESCRIPTION
in good behavioral control, cooperative and pleasant with staff failure to thrive, falls, CHF, chronic venous ulcers  with no next of kin, limited social interaction, lives alone, worked as , did not start high school, born of [place] in [place]

## 2018-01-01 NOTE — H&P ADULT - PROBLEM SELECTOR PLAN 6
F: none  E: replete as needed  N: DASH/TLC diet DVT: heparin  GI: no indication -patient has thoracotomy scar on chest, states had value repair in the past  -Echo shows annuloplasty ring in mitral position  -hx of mitral value replacement  -given fall risk and poor compliance, poor candidate for anticoagulation

## 2018-01-01 NOTE — ED BEHAVIORAL HEALTH ASSESSMENT NOTE - SUMMARY
89 , domiciled alone m with no pph and multiple medical problems p/w visual hallucinations. Visual hallucinations generally suggest organic cause, such as delirium. Full delirium work-up, including CXR, would likely be helpful. Additionally, patient likely has dementia. At this time patient does not meet criteria for psychiatric admission. However, he does not have the capacity to be discharged to refuse medical care. Patient is in good behavioral control. Primary team will have to figure out safe discharge plan for patient. It is unclear if patient is safe alone with current set-up.

## 2018-01-01 NOTE — H&P ADULT - NSHPLABSRESULTS_GEN_ALL_CORE
LABS:                         12.5   4.5   )-----------( 207      ( 2018 13:20 )             37.3         141  |  100  |  20  ----------------------------<  106<H>  3.8   |  29  |  0.98    Ca    9.5      2018 13:20    TPro  7.5  /  Alb  4.0  /  TBili  0.4  /  DBili  x   /  AST  22  /  ALT  12  /  AlkPhos  80        Urinalysis Basic - ( 2018 14:19 )    Color: Yellow / Appearance: Clear / S.010 / pH: x  Gluc: x / Ketone: NEGATIVE  / Bili: Negative / Urobili: 0.2 E.U./dL   Blood: x / Protein: NEGATIVE mg/dL / Nitrite: NEGATIVE   Leuk Esterase: NEGATIVE / RBC: 5-10 /HPF / WBC < 5 /HPF   Sq Epi: x / Non Sq Epi: x / Bacteria: Present /HPF    RADIOLOGY, EKG & ADDITIONAL TESTS: Reviewed.    < from: CT Head No Cont (18 @ 13:35) >  FINDINGS: The CT examination demonstrates age appropriate generalized   volume loss as manifested by the enlargement of the ventricles, cisternal   spaces, and cortical sulci throughout. The ventricles are stable in size   since the prior examination.  There is no acute intracranial hemorrhage, mass effect, midline shift or   extra axial collections.   The gray white differentiation appears grossly preserved without evidence   for an acute transcortical infarction.   There is confluent periventricular white matter attenuation, likely the   sequela of small vessel ischemic disease.     The bony windows demonstrates no fractures. The included paranasal  sinuses and mastoid air cells are predominantly clear.    IMPRESSION:     No acute intracranial hemorrhage or acute transcortical infarction.   Stable exam since 2017.          Echocardiogram (17 @ 14:43)   Interpretation Summary  Normal left ventricular size and wall thickness.There is moderate global   hypokinesis of the left ventricle. Abnormal (paradoxical) septal motion   consistent with post-operative status  The left ventricular ejection   fraction   is moderately reduced. The left ventricular ejection fraction is 35%.    The   left atrial size is normal. The right atrium is dilated.The right   ventricle is   normal in size and function.An annuloplasty ring is noted in the mitral   position. The mean pressure gradient is 4 mmHg. There is mild mitral   regurgitation.  There is mild pulmonary hypertension. The pulmonary   artery   systolic pressure is estimated to be 40 mmHg.  The inferior vena cava is   normal in size (<2.1 cm) with normal inspiratory collapse (>50%)   consistent   with normal right atrial pressure.  No aortic root dilatation. Normal   size   aortic arch with no hemodynamic evidence for coarctation  Right pleural   effusion noted. There is no pericardial effusion.

## 2018-01-01 NOTE — ED PROVIDER NOTE - PROGRESS NOTE DETAILS
labs and ct normal, pt continuing to have hallucinations in ED, psych evaluated and deemed that pt does not have capacity to sign out AMA, although do not think cause is primary psychiatric, think pt has advanced dementia, pt is an unsafe d/c, needs admission, possible placement, will admit to medicine on 1:1

## 2018-01-01 NOTE — ED ADULT TRIAGE NOTE - CHIEF COMPLAINT QUOTE
EMS states "his neighbor herd him screaming and called 911. When we got there, he was telling us that a 4-year old was running around ih his house, which wasn't true." EMS states "his neighbor heard him screaming and called 911. When we got there, he was telling us that a 4-year old was running around in his house, which wasn't true."

## 2018-01-01 NOTE — H&P ADULT - NSHPSOCIALHISTORY_GEN_ALL_CORE
[place], denies drinking, smoking or elicit drug use. [place], denies drinking, smoking or elicit drug use. Former , no children, grew in [place] immigrant family.

## 2018-01-01 NOTE — ED PROVIDER NOTE - MEDICAL DECISION MAKING DETAILS
89 y/o m presents after was yelling in apartment, had vision of young girl in his apartment who traveled through the wall.  Pt is alert and oriented x 3, although in ED, seeing young children and animals that aren't there.  Pt is poorly kempt, otherwise exam unremarkable.  Plan for labs and urine, head CT and will have psych see pt.

## 2018-01-01 NOTE — ED BEHAVIORAL HEALTH ASSESSMENT NOTE - HPI (INCLUDE ILLNESS QUALITY, SEVERITY, DURATION, TIMING, CONTEXT, MODIFYING FACTORS, ASSOCIATED SIGNS AND SYMPTOMS)
89 yo , domiciled alone male with no pph and pmh of chronic venous ulcers, CHF, HTN, failure to thrive with two medical admissions in 2017 BIBA after patient's neighbor activated EMS when neighbor heard patient screaming. Patient was complaining of a 4 year old girl wandering in his apartment, which was unfounded by EMS. To EMS, patient complained of right ankle and right wrist pain but denied falling. When he arrived to ED, he also told medical staff that a [employer] group had taken up residence in his apartment, which was discomfiting to the patient. CT head showed no acute processes. CBC, CMP, and utox were WNL. UA showed some bacteria. VSS- afebrile. Writer met pleasant, engaging AAO*2 (person, time) frail elderly male. Patient conveyed that he was scared to see young girl in his apartment for first time today. Additionally, he has been annoyed by "Mandaen people," who are pushy and stay in his apartment for the past "couple of days." Patient conveys that every so often superintendent of building checks in on patient, but otherwise he spends his time alone. Patient has lived alone since his wife  20+ years and has no children or family members. Patient's apartment is subsidized, and patient relies on food stamps to purchase "tv dinners." Apparently, patient is not linked up with medical care and denies taking any medications. Per ED team, patient has refused home services in the past. Patient denies any falls recently, though he endorses unsteady gait and ambulates with walker. Patient admits that "my memory is not so good."  Writer performed a SLUMS Examination, dementia screening tool, on patient, of which patient scored a 9, demonstrating possible dementia. Patient denies SI/I/P/AH/VH, depressive, manic, and roselyn psychotic symptoms. He does express concern about "Mandaen people," in his apartment and does not feel safe being there while they remain there. Patient denies falls, burning with urination, cough, or any medical illness of late. Patient has lost approximately 10 kg since 2017. Of note, patient has no insight that little girl and Mandaen people are not real.

## 2018-01-01 NOTE — H&P ADULT - PROBLEM SELECTOR PLAN 1
-patient presenting with hallucination  -CT unremarkable  -less likely toxic metabolic encephalopathy  -most likely has dementia, transfer to psychiatry pending delirium work-up  -f/u delirium work-up: CXR -patient presenting with hallucination  -CT unremarkable  -less likely toxic metabolic encephalopathy  -most likely has dementia, transfer to psychiatry pending delirium work-up  -f/u delirium work-up: CXR, RPR, TSH, folate, b12  -Utox neg, CT head neg, no electrolyte abnormality  -f/u Dr. Rojas from Psychiatry -patient presenting with hallucination  -CT unremarkable  -less likely toxic metabolic encephalopathy  -most likely has dementia, transfer to psychiatry pending delirium work-up  -f/u delirium work-up: CXR, RPR, folate, b12  -TSH wnl  -Utox neg, CT head neg, no electrolyte abnormality  -f/u Dr. Rojas from Psychiatry -patient presenting with hallucination  -CT head unremarkable  -less likely toxic metabolic encephalopathy  -most likely has dementia, transfer to psychiatry pending delirium work-up  -f/u delirium work-up: CXR, RPR, folate, b12  -TSH wnl  -Utox neg, CT head neg, no electrolyte abnormality  -f/u Psychiatry in AM -patient presenting with hallucination  -CT head unremarkable  -less likely toxic metabolic encephalopathy  -most likely has dementia, DDx: Alziemer's disease, Dementia lewy bodies, Mixed dementia  -transfer to psychiatry pending delirium work-up  -f/u delirium work-up: CXR, RPR, folate, b12  -TSH wnl  -Utox neg, CT head neg, no electrolyte abnormality  -f/u Psychiatry in AM  -consider neurology consult in AM  -Try to avoid haldol/ativan if possible, borderline QTc prolongation

## 2018-01-01 NOTE — ED PROVIDER NOTE - OBJECTIVE STATEMENT
89 y/o m hx OA presents BIBA after neighbors called because pt was yelling in his apartment.  Pt stating a young girl had appeared in his apartment, and that she had the ability to walk through walls.  Pt also reporting he has a "Restorationism group" who have been contacting him against his wishes as he is Taoist.  Pt stating he lives alone, his wife  20 years ago, has no children.  Pt has no other complaints.  Denies pain, fever, cough, n/v, CP, SOB, all other ROS negative.

## 2018-01-01 NOTE — H&P ADULT - HISTORY OF PRESENT ILLNESS
89 y/o m hx OA presents BIBA after neighbors called because pt was yelling in his apartment.  Pt stating a young girl had appeared in his apartment, and that she had the ability to walk through walls.  Pt also reporting he has a "Christianity group" who have been contacting him against his wishes as he is Jewish.  Pt stating he lives alone, his wife  20 years ago, has no children.  Pt has no other complaints.  Denies pain, fever, cough, n/v, CP, SOB, all other ROS negative.    his neighbor heard him screaming and called 911. When we got there, he was telling us that a 4-year old was running around in his house, which wasn't true    In the ED, vitals 177/97, P96, R18, T97.6, saturating at 92% RA. CBC, CMP, TSH, UA, Utox were unremarkable. CT head 89 yo , domiciled alone male with with h/o chronic venous ulcers, CHF, HTN, failure to thrive with previous admission for fall couple of months ago BIBA after patient's neighbor activated EMS when neighbor heard patient screaming. Patient was complaining of a 4 year old girl wandering in his apartment, which was unfounded by EMS and neighbors. To EMS, patient complained of right ankle and right wrist pain which is chronic but denied falling. When he arrived to ED, he reported he has a "L[employer] day group" who have been contacting him against his wishes as he is Alevism. Patient lives alone, his wife  20 years ago, has no children. In the ED, vitals 177/97, P96, R18, T97.6, saturating at 92% RA. CBC, CMP, TSH, UA, Utox were unremarkable. CT head unremarkable. Patient admitted for further evaluation.

## 2018-01-01 NOTE — H&P ADULT - NSHPPHYSICALEXAM_GEN_ALL_CORE
REVIEW OF SYSTEMS:    CONSTITUTIONAL: No weakness, fevers or chills  EYES/ENT: No visual changes;  No vertigo or throat pain   NECK: No pain or stiffness  RESPIRATORY: No cough, wheezing, hemoptysis; No shortness of breath  CARDIOVASCULAR: No chest pain or palpitations  GASTROINTESTINAL: No abdominal or epigastric pain. No nausea, vomiting, or hematemesis; No diarrhea or constipation. No melena or hematochezia.  GENITOURINARY: No dysuria, frequency or hematuria  NEUROLOGICAL: No numbness or weakness  SKIN: No itching, rashes .  VITAL SIGNS:  T(C): 37.1 (01-01-18 @ 14:22), Max: 37.1 (01-01-18 @ 14:22)  T(F): 98.8 (01-01-18 @ 14:22), Max: 98.8 (01-01-18 @ 14:22)  HR: 86 (01-01-18 @ 14:22) (86 - 96)  BP: 145/89 (01-01-18 @ 14:22) (145/89 - 177/97)  BP(mean): --  RR: 18 (01-01-18 @ 14:22) (18 - 18)  SpO2: 96% (01-01-18 @ 14:22) (92% - 96%)  Wt(kg): --    PHYSICAL EXAM:    Constitutional: WDWN resting comfortably in bed; NAD  HEENT: NC/AT, EOMI, anicteric sclera, dry mucosa  Respiratory: CTA B/L; no W/R/R, no retractions  Cardiac: +S1/S2; RRR; 3/6 crescendo-decrescento murmur radiating to carotids  Gastrointestinal: abdomen soft, NT/ND; no rebound or guarding; +BSx4  Extremities: chronic venous stasis ulcer on RLE; no peripheral edema  Dermatologic: LE with xerosis   Neurologic: AAOx2; moves all extremities; no focal deficits  Psychiatric: confabulating nonsensical conversation VITAL SIGNS:  T(C): 37.1 (01-01-18 @ 14:22), Max: 37.1 (01-01-18 @ 14:22)  T(F): 98.8 (01-01-18 @ 14:22), Max: 98.8 (01-01-18 @ 14:22)  HR: 86 (01-01-18 @ 14:22) (86 - 96)  BP: 145/89 (01-01-18 @ 14:22) (145/89 - 177/97)  BP(mean): --  RR: 18 (01-01-18 @ 14:22) (18 - 18)  SpO2: 96% (01-01-18 @ 14:22) (92% - 96%)  Wt(kg): --    PHYSICAL EXAM:    Constitutional: WDWN resting comfortably in bed; NAD  HEENT: NC/AT, EOMI, anicteric sclera, dry mucosa  Respiratory: CTA B/L; no W/R/R, no retractions  Cardiac: +S1/S2; RRR; 3/6 crescendo-decrescento murmur radiating to carotids  Gastrointestinal: abdomen soft, NT/ND; no rebound or guarding; +BSx4  Extremities: chronic venous stasis ulcer on RLE; no peripheral edema  Dermatologic: LE with xerosis   Neurologic: AAOx2; moves all extremities; no focal deficits  Psychiatric: confabulating nonsensical conversation

## 2018-01-01 NOTE — H&P ADULT - PROBLEM SELECTOR PLAN 4
-LV EF 35% on echo in 1/2017  -patient doesn't want to take medication because he believes in natural medicine -LV EF 35% on echo in 1/2017  -patient doesn't want to take medication because he believes in natural medicine  -monitor BP, if hypertensive will start ACEI  -EKG in AM -LV EF 35% on echo in 1/2017  -patient doesn't want to take medication because he believes in natural medicine  -consider BB and/or ACEI if elevated HR or BP  -EKG in AM

## 2018-01-01 NOTE — H&P ADULT - PROBLEM SELECTOR PLAN 9
-PT evaluation  -SW evaluation -PT evaluation  -SW evaluation  -consider Ethics given patient has no capacity to make decision and has hx of refusing every medical intervention

## 2018-01-01 NOTE — ED BEHAVIORAL HEALTH ASSESSMENT NOTE - OTHER
Patient has lost approximately 10 kg since 01/2017 Normal throughout most of interview but wanted to walk around and find "[employer] people" by end of interview.

## 2018-01-01 NOTE — H&P ADULT - PROBLEM SELECTOR PLAN 5
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

## 2018-01-01 NOTE — H&P ADULT - NSHPREVIEWOFSYSTEMS_GEN_ALL_CORE

## 2018-01-02 DIAGNOSIS — F03.90 UNSPECIFIED DEMENTIA WITHOUT BEHAVIORAL DISTURBANCE: ICD-10-CM

## 2018-01-02 LAB
24R-OH-CALCIDIOL SERPL-MCNC: 17.6 NG/ML — LOW (ref 30–80)
ANION GAP SERPL CALC-SCNC: 12 MMOL/L — SIGNIFICANT CHANGE UP (ref 5–17)
BUN SERPL-MCNC: 22 MG/DL — SIGNIFICANT CHANGE UP (ref 7–23)
CALCIUM SERPL-MCNC: 9.7 MG/DL — SIGNIFICANT CHANGE UP (ref 8.4–10.5)
CHLORIDE SERPL-SCNC: 100 MMOL/L — SIGNIFICANT CHANGE UP (ref 96–108)
CO2 SERPL-SCNC: 28 MMOL/L — SIGNIFICANT CHANGE UP (ref 22–31)
CREAT SERPL-MCNC: 0.99 MG/DL — SIGNIFICANT CHANGE UP (ref 0.5–1.3)
CULTURE RESULTS: SIGNIFICANT CHANGE UP
FOLATE SERPL-MCNC: 10.1 NG/ML — SIGNIFICANT CHANGE UP (ref 4.8–24.2)
GLUCOSE SERPL-MCNC: 114 MG/DL — HIGH (ref 70–99)
HBA1C BLD-MCNC: 5.5 % — SIGNIFICANT CHANGE UP (ref 4–5.6)
HCT VFR BLD CALC: 40.1 % — SIGNIFICANT CHANGE UP (ref 39–50)
HGB BLD-MCNC: 13.8 G/DL — SIGNIFICANT CHANGE UP (ref 13–17)
MAGNESIUM SERPL-MCNC: 1.9 MG/DL — SIGNIFICANT CHANGE UP (ref 1.6–2.6)
MCHC RBC-ENTMCNC: 32.6 PG — SIGNIFICANT CHANGE UP (ref 27–34)
MCHC RBC-ENTMCNC: 34.4 G/DL — SIGNIFICANT CHANGE UP (ref 32–36)
MCV RBC AUTO: 94.8 FL — SIGNIFICANT CHANGE UP (ref 80–100)
PLATELET # BLD AUTO: 211 K/UL — SIGNIFICANT CHANGE UP (ref 150–400)
POTASSIUM SERPL-MCNC: 3.8 MMOL/L — SIGNIFICANT CHANGE UP (ref 3.5–5.3)
POTASSIUM SERPL-SCNC: 3.8 MMOL/L — SIGNIFICANT CHANGE UP (ref 3.5–5.3)
RBC # BLD: 4.23 M/UL — SIGNIFICANT CHANGE UP (ref 4.2–5.8)
RBC # FLD: 13.3 % — SIGNIFICANT CHANGE UP (ref 10.3–16.9)
SODIUM SERPL-SCNC: 140 MMOL/L — SIGNIFICANT CHANGE UP (ref 135–145)
SPECIMEN SOURCE: SIGNIFICANT CHANGE UP
T PALLIDUM AB TITR SER: NEGATIVE — SIGNIFICANT CHANGE UP
VIT B12 SERPL-MCNC: 975 PG/ML — SIGNIFICANT CHANGE UP (ref 232–1245)
VIT D25+D1,25 OH+D1,25 PNL SERPL-MCNC: 54.8 PG/ML — SIGNIFICANT CHANGE UP (ref 19.9–79.3)
WBC # BLD: 5.6 K/UL — SIGNIFICANT CHANGE UP (ref 3.8–10.5)
WBC # FLD AUTO: 5.6 K/UL — SIGNIFICANT CHANGE UP (ref 3.8–10.5)

## 2018-01-02 PROCEDURE — 93010 ELECTROCARDIOGRAM REPORT: CPT

## 2018-01-02 PROCEDURE — 99233 SBSQ HOSP IP/OBS HIGH 50: CPT | Mod: GC

## 2018-01-02 PROCEDURE — 71045 X-RAY EXAM CHEST 1 VIEW: CPT | Mod: 26

## 2018-01-02 RX ORDER — HALOPERIDOL DECANOATE 100 MG/ML
0.5 INJECTION INTRAMUSCULAR ONCE
Qty: 0 | Refills: 0 | Status: COMPLETED | OUTPATIENT
Start: 2018-01-02 | End: 2018-01-02

## 2018-01-02 RX ORDER — POTASSIUM CHLORIDE 20 MEQ
20 PACKET (EA) ORAL ONCE
Qty: 0 | Refills: 0 | Status: COMPLETED | OUTPATIENT
Start: 2018-01-02 | End: 2018-01-02

## 2018-01-02 RX ADMIN — HEPARIN SODIUM 5000 UNIT(S): 5000 INJECTION INTRAVENOUS; SUBCUTANEOUS at 18:01

## 2018-01-02 RX ADMIN — HALOPERIDOL DECANOATE 0.5 MILLIGRAM(S): 100 INJECTION INTRAMUSCULAR at 23:17

## 2018-01-02 RX ADMIN — HEPARIN SODIUM 5000 UNIT(S): 5000 INJECTION INTRAVENOUS; SUBCUTANEOUS at 07:12

## 2018-01-02 RX ADMIN — Medication 20 MILLIEQUIVALENT(S): at 09:34

## 2018-01-02 NOTE — PROGRESS NOTE BEHAVIORAL HEALTH - NSBHCHARTREVIEWVS_PSY_A_CORE FT
149/92, 79, 97.9, 18 96 RA    T(C): 36.5 (02 Jan 2018 06:29), Max: 37.1 (01 Jan 2018 14:22)  T(F): 97.7 (02 Jan 2018 06:29), Max: 98.8 (01 Jan 2018 14:22)  HR: 95 (02 Jan 2018 06:29) (80 - 96)  BP: 163/85 (02 Jan 2018 06:29) (145/89 - 177/97)    RR: 20 (02 Jan 2018 06:29) (16 - 20)  SpO2: 96% (02 Jan 2018 06:29) (92% - 96%)

## 2018-01-02 NOTE — PROGRESS NOTE BEHAVIORAL HEALTH - NSBHCHARTREVIEWLAB_PSY_A_CORE FT
13.8   5.6   )-----------( 211      ( 2018 06:45 )             40.1     -    140  |  100  |  22  ----------------------------<  114<H>  3.8   |  28  |  0.99    Ca    9.7      2018 06:45  Mg     1.9         TPro  7.5  /  Alb  4.0  /  TBili  0.4  /  DBili  x   /  AST  22  /  ALT  12  /  AlkPhos  80        Urinalysis Basic - ( 2018 14:19 )    Color: Yellow / Appearance: Clear / S.010 / pH: x  Gluc: x / Ketone: NEGATIVE  / Bili: Negative / Urobili: 0.2 E.U./dL   Blood: x / Protein: NEGATIVE mg/dL / Nitrite: NEGATIVE   Leuk Esterase: NEGATIVE / RBC: 5-10 /HPF / WBC < 5 /HPF   Sq Epi: x / Non Sq Epi: x / Bacteria: Present /HPF

## 2018-01-02 NOTE — PROGRESS NOTE BEHAVIORAL HEALTH - NSBHFUPINTERVALHXFT_PSY_A_CORE
Writer called to follow-up patient, whom writecriss saw in ED 01/01/2017 for visual hallucinations. Records reviewed. Patient has had visual hallucinations throughout the day and at times has gotten up from bed. Patient has told primary team that he does not believe in medicines and has not taken medications for medical problems as an outpatient. Compared to yesterday's presentation, patient's presentation today is worse. Patient only knows of his name and neither where he is or what year it is.

## 2018-01-02 NOTE — PROGRESS NOTE BEHAVIORAL HEALTH - PROBLEM SELECTOR PLAN 1
Continue with delirium work-up. Consider how long-standing untreated medical problems may have contributed to present-day case.  Consider ABG, ammonia, Ca, Mg, ESR, PT/INR, blood cx based on clinical correlation . Encourage PO intake.  Encourage proper sleep-wake patterns.  Consider eyeglasses, as patient's eye sight is poor.  Obtain serial Qtc 496  Give haloperidol 0.5mg PO or IM or 0.25mg IVP Q4HPRN agitation (Qtc 496)  If Qtc>500, consider lorazapem 0.5mg PO or IM or lorazepam 0.25mg IVP  C/w CO.

## 2018-01-02 NOTE — PROGRESS NOTE ADULT - PROBLEM SELECTOR PLAN 4
-LV EF 35% on echo in 1/2017  -patient doesn't want to take medication because he believes in natural medicine  -consider BB and/or ACEI if elevated HR or BP  -EKG in AM -LV EF 35% on echo in 1/2017  -patient doesn't want to take medication because he believes in natural medicine  -consider BB and/or ACEI if elevated HR or BP

## 2018-01-02 NOTE — ADVANCED PRACTICE NURSE CONSULT - ASSESSMENT
Patient having periods of visual hallucinations and pleasantly confused presented with dry, scaly skin on bilateral LEs with dry, non-fluctuant scab on right medial malleolus measuring 2.5 cm x 1.5 cm. Patient also presented with dry scab on bridge of nose measuring 0.5 cm x 0.3 cm.

## 2018-01-02 NOTE — PHYSICAL THERAPY INITIAL EVALUATION ADULT - GAIT DEVIATIONS NOTED, PT EVAL
decreased step length/decreased stride length/increased stride width/increased time in double stance/decreased jacinda

## 2018-01-02 NOTE — ADVANCED PRACTICE NURSE CONSULT - REASON FOR CONSULT
ADMITTED TO TELEMETRY FOR SEIZURE DISORDER UNDER THE SERVICE OF SASKIA. REPORT 
GIVEN TO TRINIDAD ORNELAS. PT GOING TO ROOM 109-B. PT REMAINS STABLE,SEIZURE FREE. [facility] nurse consult for 89 yo , domiciled alone male with with h/o chronic venous ulcers, CHF, HTN, failure to thrive with previous admission for fall couple of months ago BIBA after patient's neighbor activated EMS when neighbor heard patient screaming. Patient was complaining of a 4 year old girl wandering in his apartment, which was unfounded by EMS and neighbors. To EMS, patient complained of right ankle and right wrist pain which is chronic but denied falling. When he arrived to ED, he reported he has a "Adventism group" who have been contacting him against his wishes as he is Denominational. Patient lives alone, his wife  20 years ago, has no children. In the ED, vitals 177/97, P96, R18, T97.6, saturating at 92% RA. CBC, CMP, TSH, UA, Utox were unremarkable. CT head unremarkable. Patient admitted for further evaluation.

## 2018-01-02 NOTE — PHYSICAL THERAPY INITIAL EVALUATION ADULT - CRITERIA FOR SKILLED THERAPEUTIC INTERVENTIONS
therapy frequency/impairments found/anticipated equipment needs at discharge/rehab potential/anticipated discharge recommendation

## 2018-01-02 NOTE — PROGRESS NOTE ADULT - PROBLEM SELECTOR PLAN 3
-5cm x5cm ulceration on RLE medial aspect angle medial malleolus with dry skin, scabbing suggestive of chronic vascular insufficiency. No warmth, induration, drainage or purulence  -consider wound care in AM -Bilateral LE dry, scaly skin - apply AtracTain cream twice daily.  -RLE medial malleolus scab - apply hydrogel, Telfa and Kerlix every other day.   -f/u wound care recs

## 2018-01-02 NOTE — PROGRESS NOTE BEHAVIORAL HEALTH - OTHER
Patient has lost approximately 10 kg since 01/2017 superficially cooperative attempting to get out of bed

## 2018-01-02 NOTE — PHYSICAL THERAPY INITIAL EVALUATION ADULT - PERTINENT HX OF CURRENT PROBLEM, REHAB EVAL
89 yo , domiciled alone male with with h/o chronic venous ulcers, CHF, HTN, failure to thrive with previous admission for fall couple of months ago BIBA after patient's neighbor activated EMS when neighbor heard patient screaming. Patient was complaining of a 4 year old girl wandering in his apartment, which was unfounded by EMS and neighbors. To EMS, patient complained of right ankle and right wrist pain which is chronic but denied falling.

## 2018-01-02 NOTE — PHYSICAL THERAPY INITIAL EVALUATION ADULT - ADDITIONAL COMMENTS
Pt unable to state where he lives and his living situation. Pt unable to explain how he ambulated prior to hospital admit.

## 2018-01-02 NOTE — ADVANCED PRACTICE NURSE CONSULT - RECOMMEDATIONS
Bilateral LE dry, scaly skin - apply AtracTain cream twice daily.  RLE medial malleolus scab - apply hydrogel, Telfa and Kerlix every other day.   Discussed assessment and recommendations with RN, Carey and house staff.

## 2018-01-02 NOTE — PROGRESS NOTE BEHAVIORAL HEALTH - PROBLEM SELECTOR PLAN 2
Rule out delirium first.  Give haloperidol 0.5mg PO or IM or 0.25mg IVP Q4HPRN agitation (Qtc 496)  Consider mood stabilizers, such as valproic acid, if liver function preserved.

## 2018-01-02 NOTE — PROGRESS NOTE ADULT - PROBLEM SELECTOR PLAN 1
-patient presenting with hallucination  -CT head unremarkable  -less likely toxic metabolic encephalopathy  -most likely has dementia, DDx: Alziemer's disease, Dementia lewy bodies, Mixed dementia  -transfer to psychiatry pending delirium work-up  -f/u delirium work-up: CXR, RPR, folate, b12  -TSH wnl  -Utox neg, CT head neg, no electrolyte abnormality  -f/u Psychiatry in AM  -consider neurology consult in AM  -Try to avoid haldol/ativan if possible, borderline QTc prolongation -patient presenting with hallucination  -CT head unremarkable  -less likely toxic metabolic encephalopathy  -most likely has dementia, DDx: Alziemer's disease, Dementia lewy bodies, Mixed dementia  -transfer to psychiatry pending delirium work-up  -f/u delirium work-up: CXR, RPR, folate, b12  -TSH wnl  -Utox neg, CT head neg, no electrolyte abnormality  -f/u Psychiatry recs  -consider neurology consult   -Try to avoid haldol/ativan if possible, borderline QTc prolongation

## 2018-01-03 DIAGNOSIS — I50.22 CHRONIC SYSTOLIC (CONGESTIVE) HEART FAILURE: ICD-10-CM

## 2018-01-03 DIAGNOSIS — F03.90 UNSPECIFIED DEMENTIA WITHOUT BEHAVIORAL DISTURBANCE: ICD-10-CM

## 2018-01-03 PROCEDURE — 99233 SBSQ HOSP IP/OBS HIGH 50: CPT | Mod: GC

## 2018-01-03 PROCEDURE — 99233 SBSQ HOSP IP/OBS HIGH 50: CPT

## 2018-01-03 PROCEDURE — 93010 ELECTROCARDIOGRAM REPORT: CPT

## 2018-01-03 RX ORDER — CHOLECALCIFEROL (VITAMIN D3) 125 MCG
1000 CAPSULE ORAL DAILY
Qty: 0 | Refills: 0 | Status: DISCONTINUED | OUTPATIENT
Start: 2018-01-03 | End: 2018-01-04

## 2018-01-03 RX ADMIN — Medication 1000 UNIT(S): at 11:16

## 2018-01-03 RX ADMIN — HEPARIN SODIUM 5000 UNIT(S): 5000 INJECTION INTRAVENOUS; SUBCUTANEOUS at 18:08

## 2018-01-03 RX ADMIN — HEPARIN SODIUM 5000 UNIT(S): 5000 INJECTION INTRAVENOUS; SUBCUTANEOUS at 07:33

## 2018-01-03 NOTE — PROGRESS NOTE BEHAVIORAL HEALTH - RISK ASSESSMENT
Confusion with possible worsening at night consistent with dementia and poor vision.
altered sensorium,

## 2018-01-03 NOTE — PROGRESS NOTE ADULT - PROBLEM SELECTOR PLAN 1
-patient presenting with hallucination  -CT head unremarkable  -Low suspicion for infectious etiology   -most likely has dementia, DDx: Dementia lewy bodies vs Mixed dementia vs Alziemer's disease   -low suspicion for delirium   -CXR: unremarkable, RPR: neg, b12/folate: wnl  -TSH wnl  -Utox neg, CT head neg, no electrolyte abnormality  -f/u Psychiatry recs  -c/w haloperidol 0.5mg PO or IM or 0.25mg IVP Q4HPRN agitation (Qtc 496)  -If Qtc>500, consider lorazapem 0.5mg PO or IM or lorazepam 0.25mg IVP  -serial EKGs  -consider neurology consult

## 2018-01-03 NOTE — PROGRESS NOTE BEHAVIORAL HEALTH - NSBHCONSULTFOLLOWDETAILS_PSY_A_CORE FT
Note I am not available to follow-up but if needed, please recall psych consult to notify team of need for follow-up.

## 2018-01-03 NOTE — PROGRESS NOTE ADULT - PROBLEM SELECTOR PLAN 5
-LV EF 35% on echo in 1/2017  -patient doesn't want to take medication because he believes in natural medicine  -consider BB and/or ACEI if elevated HR or BP -LV EF 35% on echo in 1/2017  -patient doesn't want to take medication because he believes in natural medicine  -clinically stable currently, consider BB and/or ACEI if elevated HR or BP  -no signs of fluid overload

## 2018-01-03 NOTE — PROGRESS NOTE BEHAVIORAL HEALTH - NSBHATTESTSEENBY_PSY_A_CORE
attending Psychiatrist without NP/Trainee
Trainee with telephonic supervision from Attending Psychiatrist

## 2018-01-03 NOTE — PROGRESS NOTE BEHAVIORAL HEALTH - SUMMARY
89 yo male with vision and memory problems admitted for agitation at home in context of thinking he saw people in his apartment (unclear to this writer if these were clear hallucinations vs. illusions).   History of failure to thrive and poor self care.   Being monitored by medicine team for acute medical issues while safe discharge planning is started.
89 yo male with formal pph and multiple medical problems (not on medications) appears more altered than he was yesterday when writer saw patient. Patient likely has dementia but a delirium superimposed on dementia needs to be sought out. Patient would not benefit from inpatient psychiatric care on [facility].

## 2018-01-03 NOTE — PROGRESS NOTE BEHAVIORAL HEALTH - NSBHCONSULTMEDAGITATION_PSY_A_CORE FT
see above. If clearly hallucinating, can offer Haldol 0.25-0.5mg po q 8 hrs.   If needed, please follow QTc.  Would not use Haldol if QTC > 490msec.

## 2018-01-03 NOTE — PROGRESS NOTE BEHAVIORAL HEALTH - OTHER
Patient has lost approximately 10 kg since 01/2017 cooperative now but last night oppositional. not assessed as patient in bed. memory is 3/3 immed and 0/3 after 3 min.

## 2018-01-03 NOTE — PROGRESS NOTE BEHAVIORAL HEALTH - ADDITIONAL DETAILS / COMMENTS
Attempted full MMSE; Patient not able to do last 3 steps because of poor vision but otherwise only missed day of week and month (25/27)

## 2018-01-03 NOTE — PROGRESS NOTE BEHAVIORAL HEALTH - NSBHCONSULTOBSREASON_PSY_A_CORE FT
Patient is not acutely suicidal or homocidal.  Behavioral problems consistent with dementia.   Can be monitored on ES with increased time observed as indicated by RN and floor head RN or cohorted with another ES patient.
fall risk

## 2018-01-03 NOTE — PROGRESS NOTE BEHAVIORAL HEALTH - NSBHFUPINTERVALHXFT_PSY_A_CORE
Patient seen by psych on call last night for worsening agitation in context of presumed visual hallucinations.  Patient was given Haldol 0.5mg im x1 for acute dangerousness with good effect.  Was placed on CO for safety.   Today, patient is friendly and welcomes interview.  Denies remembering any problems from overnight.  Talks with this MD for about 30 min discussing history.  Of note, patient is only fair-poor historian.  Has problems focusing on question to answer concisely and is overinclusive.       Does talk about crying at one point but unable to clearly tell me if he cries because of sadness or other reasons.    Patient admits to having poor vision.  States he uses magnifying glasses at home .  Doesn't have glasses but "probably could use 3-4 pairs".  Is able to recount problem at home describing seeing "their bedding like they were moving in" and then seeing several people he thought were moving into his apartment to stay.  States that was 'when I had enough" and got upset (?? visual hallucinations consistent with Lewy Body Dementia vs. perceptual illusions from poor vision and decreased light??).    Is able to tell me he is , wife's name was Amina, lives at [facility] [address] for decades.  Has problems telling me about family and any emergency contacts finally listing 2 nephews "Rocael and Andrea" but then admits he hasn't spoken to them in years.    Admits to binge etoh in 20-30s but no recent etoh/drug/cig use otherwise.    Labs and head CT reviewed and negative for reversible causes of dementia (TSH normal, syphilis neg, UA neg nitrate/leuk esterase, B-12 and folate normal. Patient seen by psych on call last night for worsening agitation in context of presumed visual hallucinations.  Patient was given Haldol 0.5mg po x1 for acute agitation with good effect.  Was placed on CO for safety.   Today, patient is friendly and welcomes interview.  Denies remembering any problems from overnight.  Talks with this MD for about 30 min discussing history.  Of note, patient is only fair-poor historian.  Has problems focusing on question to answer concisely and is overinclusive.       Does talk about crying at one point but unable to clearly tell me if he cries because of sadness or other reasons.    Patient admits to having poor vision.  States he uses magnifying glasses at home .  Doesn't have glasses but "probably could use 3-4 pairs".  Is able to recount problem at home describing seeing "their bedding like they were moving in" and then seeing several people he thought were moving into his apartment to stay.  States that was 'when I had enough" and got upset (?? visual hallucinations consistent with Lewy Body Dementia vs. perceptual illusions from poor vision and decreased light??).    Is able to tell me he is , wife's name was Amina, lives at [facility] [address] for decades.  Has problems telling me about family and any emergency contacts finally listing 2 nephews "Rocael and Andrea" but then admits he hasn't spoken to them in years.    Admits to binge etoh in 20-30s but no recent etoh/drug/cig use otherwise.    Labs and head CT reviewed and negative for reversible causes of dementia (TSH normal, syphilis neg, UA neg nitrate/leuk esterase, B-12 and folate normal.

## 2018-01-03 NOTE — PROGRESS NOTE BEHAVIORAL HEALTH - NSBHCONSFOLLOWNEEDS_PSY_A_CORE
pending safe dispo like KRISSY or similar/no psychiatric contraindications to discharge
Patient needs further psychiatric safety assessment prior to discharge

## 2018-01-03 NOTE — PROGRESS NOTE BEHAVIORAL HEALTH - NSBHCONSULTMEDANXIETY_PSY_A_CORE FT
Trazodone 25mg po q8hr prn anxiety or insomnia.  If not able to tolerate po, can use Haldol 0.25-0.5mg im x1 for acute dangerousness.  If needed, please follow QTc.  Would not use Haldol if QTC > 490msec. Trazodone 25mg po q8hr prn anxiety or insomnia.

## 2018-01-03 NOTE — PROGRESS NOTE ADULT - PROBLEM SELECTOR PLAN 2
-PT evaluation: recommended KRISSY, but patient has poor cognition  -SW evaluation  -consider Ethics given patient has no capacity to make decision and has hx of refusing every medical intervention -PT evaluation: recommended KRISSY, but patient has poor cognition  -SW evaluation  -consider Ethics given patient has no capacity to make decision

## 2018-01-03 NOTE — PROGRESS NOTE ADULT - PROBLEM SELECTOR PLAN 4
-Bilateral LE dry, scaly skin - apply AtracTain cream twice daily.  -RLE medial malleolus scab - apply hydrogel, Telfa and Kerlix every other day.   -f/u wound care recs

## 2018-01-03 NOTE — CONSULT NOTE ADULT - SUBJECTIVE AND OBJECTIVE BOX
Patient is a 89y old  Male who presents with a chief complaint of Hallucination (2018 17:45)       HPI:  89 yo , domiciled alone male with with h/o chronic venous ulcers, CHF, HTN, failure to thrive with previous admission for fall couple of months ago BIBA after patient's neighbor activated EMS when neighbor heard patient screaming. Patient was complaining of a 4 year old girl wandering in his apartment, which was unfounded by EMS and neighbors. To EMS, patient complained of right ankle and right wrist pain which is chronic but denied falling. When he arrived to ED, he reported he has a "Sabianist group" who have been contacting him against his wishes as he is Gnosticism. Patient lives alone, his wife  20 years ago, has no children. In the ED, vitals 177/97, P96, R18, T97.6, saturating at 92% RA. CBC, CMP, TSH, UA, Utox were unremarkable. CT head unremarkable. Patient admitted for further evaluation. (2018 17:45)      PAST MEDICAL & SURGICAL HISTORY:  Osteoarthritis  History of heart valve replacement: Mitral valve replaced  History of heart valve replacement: Mitral valve replaced      MEDICATIONS  (STANDING):  cholecalciferol 1000 Unit(s) Oral daily  heparin  Injectable 5000 Unit(s) SubCutaneous every 12 hours    MEDICATIONS  (PRN):  acetaminophen   Tablet. 650 milliGRAM(s) Oral every 6 hours PRN Moderate Pain (4 - 6)      Social History: , reports he lives alone in an elevator accessible apartment building, denied home care servcies    Functional Level Prior to Admission: reports ADL independent, walks without assistive devices    FAMILY HISTORY:  No pertinent family history      CBC Full  -  ( 2018 06:45 )  WBC Count : 5.6 K/uL  Hemoglobin : 13.8 g/dL  Hematocrit : 40.1 %  Platelet Count - Automated : 211 K/uL  Mean Cell Volume : 94.8 fL  Mean Cell Hemoglobin : 32.6 pg  Mean Cell Hemoglobin Concentration : 34.4 g/dL  Auto Neutrophil # : x  Auto Lymphocyte # : x  Auto Monocyte # : x  Auto Eosinophil # : x  Auto Basophil # : x  Auto Neutrophil % : x  Auto Lymphocyte % : x  Auto Monocyte % : x  Auto Eosinophil % : x  Auto Basophil % : x      01-02    140  |  100  |  22  ----------------------------<  114<H>  3.8   |  28  |  0.99    Ca    9.7      2018 06:45  Mg     1.9                   Radiology:    < from: Xray Chest 1 View AP -PORTABLE-Routine (18 @ 06:37) >  EXAM:  XR CHEST PORTABLE  ROUTINE 1V                          PROCEDURE DATE:  2018                     INTERPRETATION:  XR CHEST PORTABLE  ROUTINE 1V dated 2018 6:37 AM    CLINICAL INFORMATION: Abnormal Chest Sounds    COMPARISON: 2017    FINDINGS: Median sternotomy wires and prosthetic heart valve are again   noted. There is a small right pleural effusion. There is no pneumothorax.   There is no focal consolidation.    IMPRESSION: Small right pleural effusion.        < from: CT Head No Cont (18 @ 13:35) >  EXAM:  CT BRAIN                          PROCEDURE DATE:  2018                     INTERPRETATION:  Boyd KUHN MD, have reviewed the images and   the report and agree with the findings.    RESIDENT PRELIMINARY REPORT    PROCEDURE: CT head without contrast.    INDICATION: Visual hallucinations.    TECHNIQUE: Multiple axial sections were obtained at 5 mm intervals. The   images were reviewed in brain and bone windows. Imaging is performed   using helical low-dose technique, andsagittal and coronal reformations   are provided.    COMPARISON: CT brain 2017.    FINDINGS: The CT examination demonstrates age appropriate generalized   volume loss as manifested by the enlargement of the ventricles, cisternal   spaces, and cortical sulci throughout. The ventricles are stable in size   since the prior examination.    There is no acute intracranial hemorrhage, mass effect, midline shift or   extra axial collections.     The gray white differentiation appears grossly preserved without evidence   for an acute transcortical infarction.     There is confluent periventricular white matter attenuation, likely the   sequela of small vessel ischemic disease.     The bony windows demonstrates no fractures. The included paranasal  sinuses and mastoid air cells are predominantly clear.    IMPRESSION:     No acute intracranial hemorrhage or acute transcortical infarction.   Stable exam since 2017.                  Vital Signs Last 24 Hrs  T(C): 36.1 (2018 08:53), Max: 36.9 (2018 21:00)  T(F): 97 (2018 08:53), Max: 98.4 (2018 21:00)  HR: 76 (2018 08:53) (76 - 106)  BP: 145/79 (2018 08:53) (123/79 - 146/80)  BP(mean): --  RR: 20 (2018 08:53) (18 - 20)  SpO2: 96% (2018 08:53) (96% - 99%)    REVIEW OF SYSTEMS:    CONSTITUTIONAL:  fatigue  EYES: No eye pain, visual disturbances, or discharge  ENMT:  No difficulty hearing, tinnitus, vertigo; No sinus or throat pain  NECK: No pain or stiffness  BREASTS: No pain, masses, or nipple discharge  RESPIRATORY: No cough, wheezing, chills or hemoptysis; No shortness of breath  CARDIOVASCULAR: No chest pain, palpitations, dizziness, or leg swelling  GASTROINTESTINAL: No abdominal or epigastric pain. No nausea, vomiting, or hematemesis; No diarrhea or constipation. No melena or hematochezia.  GENITOURINARY: No dysuria, frequency, hematuria, or incontinence  NEUROLOGICAL: No headaches, memory loss, loss of strength, numbness, or tremors  SKIN: No itching, burning, rashes, or lesions   LYMPH NODES: No enlarged glands  ENDOCRINE: No heat or cold intolerance; No hair loss  MUSCULOSKELETAL: No joint pain or swelling; No muscle, back, or extremity pain  PSYCHIATRIC: No depression, anxiety, mood swings, or difficulty sleeping  HEME/LYMPH: No easy bruising, or bleeding gums  ALLERGY AND IMMUNOLOGIC: No hives or eczema      Physical Exam: thin appearing  gentleman lying in bed NAD    HEENT: normocephalic,  anicteric,  atraumatic    Neck: supple, negative JVD, negative carotid bruits,    Chest: CTA bilaterally, neg wheeze, rhonchi, rales, crackles, egophany    Cardiovascular: regular rate and rhythm, III/VI crescendo/decrescendo murmur    Abdomen: soft, non distended, non tender, negative rebound/guarding, normal bowel sounds, neg hepatosplenomegaly    Extremities: WWP, neg cyanosis/clubbing/edema, negative calf tenderness to palpation, negative Tera's sign, dry R leg venous stasis ulcer    :     Neurologic Exam:    Alert and oriented x 2 to person, place,  2016 date/year, speech fluent w/o dysarthria, repetition intact, comprehension intact, follows commands    Cranial Nerves:     II:                       pupils equal, round and reactive to light, decreased acuity  III/ IV/VI:             extraocular movements intact, neg nystagmus, ptosis  V:                      facial sensation intact, V1-3 normal  VII:                     face symmetric, no droop, normal eye closure and smile  VIII:                    hearing intact to finger rub bilaterally  IX/ X:                  soft palate rise symmetrical  XI:                      head turning, shoulder shrug normal  XII:                     tongue midline    Motor Exam:    Bilateral UE:         4/5 /intrinsics                            4+/5 biceps/triceps/wrist extensors-flexors/deltoid                            negative pronator drift      Bilateral LE:          4-/5 hip flexors/adductors/abductors                             4+/5 quadriceps/hamstrings                            5/5 dorsiflexors/plantar flexors/invertors-evertors    Sensory: intact to LT/PP in all UE/LE dermatomes    DTR:       =  biceps/     triceps/     brachioradialis                =  patella/   medial hamstring/    ankle                 neg clonus                 neg Babinski                 neg Hoffmans      Romberg: NT    Tandem Walking: NT    Gait:  NT        PM&R Impression:    1) deconditioned  2) no focal weakness  3) delirium      Recommendations:    1) Physical therapy focusing on therapeutic exercises, bed mobility/transfer out of bed evaluation, progressive ambulation with assistive devices.    2) Disposition Plan: anticipate subacute rehab placement

## 2018-01-03 NOTE — CONSULT NOTE ADULT - SUBJECTIVE AND OBJECTIVE BOX
CLINICAL AS DOCUMENTED BY PROVIDERS:  89 yo , domiciled alone male with with h/o chronic venous ulcers, CHF, HTN, failure to thrive with previous admission for fall couple of months ago BIBA after patient's neighbor activated EMS when neighbor heard patient screaming. Patient was complaining of a 4 year old girl wandering in his apartment, which was unfounded by EMS and neighbors. To EMS, patient complained of right ankle and right wrist pain which is chronic but denied falling. When he arrived to ED, he reported he has a "Hinduism group" who have been contacting him against his wishes as he is Restorationism. Patient lives alone, his wife  20 years ago, has no children. In the ED, vitals 177/97, P96, R18, T97.6, saturating at 92% RA. CBC, CMP, TSH, UA, Utox were unremarkable. CT head unremarkable. Patient admitted for further evaluation.     ETHICS CONCERN:  Patient with limited capacity and without surrogates for discharge planning and clinical decision-making.    ETHICS PROCESS:  Chart reviewed, case discussed with intern Dr. Lomas, patient seen at bedside.      ETHICS FINDINGS:  Mr. 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  At this time patient is oriented to himself and has some but limited insight into his situation.  The patient was cooperative with my visit.  The patient cannot recount the circumstances of his admission but does acknowledge that he is not well now and needs help to get along.  He is unhappily willing to go to a setting rather than home at this time.  Med team reports they have found no reversible cause of his deterioration now characterized as dementia.  The pt. reports that he has 2 sons, Jose and Andrea, emphasizing I should speak with Jose because he is the oldest.  I was able to locate Rocael Renteria in [place] [phone], who reports that he is the patient's nephew.  The patient had no children.  Andrea is Rocael's brother.  Both live in [place].  The patient was born in [place] but has been living in [place] for 50 years.  Rocael states that they had been in regular touch up until about 1 year ago.  He states he had tried to help the patient with a few dollars here and there and tried to elicit patient's wishes re: burial, etc.  But, the patient has always been stubborn and marched to his own tune.  Rocael reports that the patient had been becoming more difficult and help rejecting, so they lost touch.  He confirms the patient's preference for natural approaches to health stating that he "was never one to go to a doctor."      ETHICS ASSESSMENT AND RECS:  The patient appears to accept that he cannot be independent at this time and is open to placement.  [facility] is the current plan per team.  Would consider a palliative consult as hospice may be a more appropriate option given the patient's limited functional status and prognosis.  Rocael Renteria may function as a health care surrogate if he is willing.  He plans to talk with his brother and sister and consider whether he is willing.  Would advocate a MOLST with conservative orders given the patient's condition and long held values.  This was discussed with Rocael and he will consider participating in such decisions as he agrees this is consistent with the patient's values.      Will follow with you.

## 2018-01-03 NOTE — PROGRESS NOTE BEHAVIORAL HEALTH - NSBHCONSULTRECOMMENDOTHER_PSY_A_CORE FT
Please obtain reading glasses from Patient Experience.  It is possible with better vision, he will have less VH/illusions and less agitaiton.

## 2018-01-03 NOTE — PROGRESS NOTE BEHAVIORAL HEALTH - NSBHADMITCOUNSEL_PSY_A_CORE
risk factor reduction/diagnostic results/impressions and/or recommended studies/risks and benefits of treatment options/instructions for management, treatment and follow up

## 2018-01-03 NOTE — CONSULT NOTE ADULT - ASSESSMENT
89M with h/o OA BIBA after having episode of hallucination/screaming in his apartment.    Problem/Plan - 1:  ·  Problem: Hallucinations.  Plan: -patient presenting with hallucination  -CT head unremarkable  -Low suspicion for infectious etiology   -most likely has dementia, DDx: Dementia lewy bodies vs Mixed dementia vs Alziemer's disease   -low suspicion for delirium   -CXR: unremarkable, RPR: neg, b12/folate: wnl  -TSH wnl  -Utox neg, CT head neg, no electrolyte abnormality  -f/u Psychiatry recs  -c/w haloperidol 0.5mg PO or IM or 0.25mg IVP Q4HPRN agitation (Qtc 496)  -If Qtc>500, consider lorazapem 0.5mg PO or IM or lorazepam 0.25mg IVP  -serial EKGs  -consider neurology consult.

## 2018-01-04 ENCOUNTER — TRANSCRIPTION ENCOUNTER (OUTPATIENT)
Age: 83
End: 2018-01-04

## 2018-01-04 VITALS
DIASTOLIC BLOOD PRESSURE: 74 MMHG | SYSTOLIC BLOOD PRESSURE: 125 MMHG | TEMPERATURE: 99 F | RESPIRATION RATE: 20 BRPM | HEART RATE: 85 BPM | OXYGEN SATURATION: 95 %

## 2018-01-04 PROCEDURE — 80053 COMPREHEN METABOLIC PANEL: CPT

## 2018-01-04 PROCEDURE — 82607 VITAMIN B-12: CPT

## 2018-01-04 PROCEDURE — 83735 ASSAY OF MAGNESIUM: CPT

## 2018-01-04 PROCEDURE — 99285 EMERGENCY DEPT VISIT HI MDM: CPT | Mod: 25

## 2018-01-04 PROCEDURE — 81001 URINALYSIS AUTO W/SCOPE: CPT

## 2018-01-04 PROCEDURE — 71045 X-RAY EXAM CHEST 1 VIEW: CPT

## 2018-01-04 PROCEDURE — 83036 HEMOGLOBIN GLYCOSYLATED A1C: CPT

## 2018-01-04 PROCEDURE — 85027 COMPLETE CBC AUTOMATED: CPT

## 2018-01-04 PROCEDURE — 84443 ASSAY THYROID STIM HORMONE: CPT

## 2018-01-04 PROCEDURE — 80048 BASIC METABOLIC PNL TOTAL CA: CPT

## 2018-01-04 PROCEDURE — 36415 COLL VENOUS BLD VENIPUNCTURE: CPT

## 2018-01-04 PROCEDURE — 93005 ELECTROCARDIOGRAM TRACING: CPT

## 2018-01-04 PROCEDURE — 99239 HOSP IP/OBS DSCHRG MGMT >30: CPT

## 2018-01-04 PROCEDURE — 87086 URINE CULTURE/COLONY COUNT: CPT

## 2018-01-04 PROCEDURE — 85025 COMPLETE CBC W/AUTO DIFF WBC: CPT

## 2018-01-04 PROCEDURE — 82652 VIT D 1 25-DIHYDROXY: CPT

## 2018-01-04 PROCEDURE — 70450 CT HEAD/BRAIN W/O DYE: CPT

## 2018-01-04 PROCEDURE — 86780 TREPONEMA PALLIDUM: CPT

## 2018-01-04 PROCEDURE — 82746 ASSAY OF FOLIC ACID SERUM: CPT

## 2018-01-04 PROCEDURE — 80307 DRUG TEST PRSMV CHEM ANLYZR: CPT

## 2018-01-04 PROCEDURE — 82306 VITAMIN D 25 HYDROXY: CPT

## 2018-01-04 RX ORDER — ACETAMINOPHEN 500 MG
2 TABLET ORAL
Qty: 0 | Refills: 0 | COMMUNITY
Start: 2018-01-04

## 2018-01-04 RX ORDER — CHOLECALCIFEROL (VITAMIN D3) 125 MCG
1000 CAPSULE ORAL
Qty: 0 | Refills: 0 | COMMUNITY
Start: 2018-01-04

## 2018-01-04 RX ADMIN — Medication 1000 UNIT(S): at 12:45

## 2018-01-04 NOTE — DISCHARGE NOTE ADULT - HOSPITAL COURSE
89 yo , domiciled alone male with with h/o chronic venous ulcers, CHF, HTN, failure to thrive with previous admission for fall couple of months ago BIBA after patient's neighbor activated EMS when neighbor heard patient screaming admitted to [facility] for hallucinations pending safe discharge. Patient was complaining of a 4 year old girl wandering in his apartment, which was unfounded by EMS and neighbors. To EMS, patient complained of right ankle and right wrist pain which is chronic but denied falling. When he arrived to ED, he reported he has a "Adventism group" who have been contacting him against his wishes as he is Yazidi. His CT head was unremarkable. CXR: unremarkable. RPR: neg, b12/folate: wnl, TSH wnl. Utox neg, no electrolyte abnormality. No need for LP as low suspicion for encephalitis. There was low suspicion for infectious etiology or delirium. Patient likely has mild cognitive impairment. Other ddx: dementia lewy bodies vs Mixed dementia vs Alziemer's disease. Patient initially had no capacity to make decision per Psych. Ethics consulted as patient has no children/wife, found nephew Jose Evans in [place] ([phone]). Psych signed off the case, PT recommended [facility]. Patient himself agreed to go [facility]. Patient is medically stable for discharge. Off note, patient has history of chronic CHF which is clinically stable. Additionally, he has been a natural medicine believer who never been on medication. No medications were started on this visit.

## 2018-01-04 NOTE — PROGRESS NOTE ADULT - ATTENDING COMMENTS
Pt. seen and examined by me earlier today; I have read Dr. Lomas's note, I agree w/ his findings and plan of care as documented; Pt. medically-clear for d/c to [facility]; Ethics input appreciated
Pt. seen and examined by me earlier today; I have read Dr. Lomas's note, I agree w/ his findings and plan of care as documented; need collateral info for safe d/c planning, f/u Psych recs
Pt. seen and examined by me; I have read Dr. Lomas's note, I agree w/ his finding and plan of care as documented; Pt. w/ likely dementia; no e/o acute toxic-metabolic derangement on work-up, and CT head unremarkable; appreciate Psych recs; cont. frequent re-orientation, assist w/ ADLs; will try to provide Pt. w/ eyeglasses; Ethics consulted to assist w/ safe d/c planning

## 2018-01-04 NOTE — PROGRESS NOTE ADULT - PROBLEM SELECTOR PLAN 8
DVT: heparin  GI: no indication
DVT: heparin  GI: no indication
F: none  E: replete as needed  N: DASH/TLC diet

## 2018-01-04 NOTE — DISCHARGE NOTE ADULT - MEDICATION SUMMARY - MEDICATIONS TO STOP TAKING
I will STOP taking the medications listed below when I get home from the hospital:    walker   -- Apply on skin to affected area once a day    dispense 1 rolling walker     ht: 5'9"  wt 155 lbs

## 2018-01-04 NOTE — PROGRESS NOTE ADULT - PROBLEM SELECTOR PLAN 2
-PT evaluation: recommended [facility]  -f/u Ethics  -patient is open to placement at [facility]  -will advocate for MOLST form   -Contact info: Elizabeth Renteria ([email or web address]) [phone]; patient has no children

## 2018-01-04 NOTE — PROGRESS NOTE ADULT - PROBLEM SELECTOR PROBLEM 3
Primary osteoarthritis, unspecified site
Chronic ulcer of lower extremity, right, with unspecified severity
Primary osteoarthritis, unspecified site

## 2018-01-04 NOTE — PROGRESS NOTE ADULT - PROBLEM SELECTOR PROBLEM 9
Nutrition, metabolism, and development symptoms
Discharge planning issues
Nutrition, metabolism, and development symptoms

## 2018-01-04 NOTE — DISCHARGE NOTE ADULT - CARE PLAN
Principal Discharge DX:	Mild cognitive impairment  Goal:	Safety  Instructions for follow-up, activity and diet:	You came to the hospital with hallucination. There was no infectious etiology, electrolyte abnormality or metabolic derangement to explain your behavior. You likely have mild cognitive impairment. You are being discharged to rehab given impaired balance.  Secondary Diagnosis:	Chronic systolic (congestive) heart failure  Instructions for follow-up, activity and diet:	You have history of chronic CHF which is stable without medications. Please seen a doctor if you have CHF exacerbation.  Secondary Diagnosis:	Chronic ulcer of lower extremity, right, with unspecified severity  Instructions for follow-up, activity and diet:	You have chronic lower extremity dry, scaly skin, please apply AtracTain cream twice daily. You also have right lower extremity medial malleolus scab, please apply hydrogel, Telfa and Kerlix every other day. Principal Discharge DX:	Mild cognitive impairment  Goal:	Safety  Assessment and plan of treatment:	You came to the hospital with hallucination. There was no infectious etiology, electrolyte abnormality or metabolic derangement to explain your behavior. You likely have mild cognitive impairment. You are being discharged to rehab given impaired balance.  Secondary Diagnosis:	Chronic systolic (congestive) heart failure  Assessment and plan of treatment:	You have history of chronic CHF which is stable without medications. Please seen a doctor if you have CHF exacerbation.  Secondary Diagnosis:	Chronic ulcer of lower extremity, right, with unspecified severity  Assessment and plan of treatment:	You have chronic lower extremity dry, scaly skin, please apply AtracTain cream twice daily. You also have right lower extremity medial malleolus scab, please apply hydrogel, Telfa and Kerlix every other day.

## 2018-01-04 NOTE — PROGRESS NOTE ADULT - SUBJECTIVE AND OBJECTIVE BOX
INTERVAL HPI/OVERNIGHT EVENTS: Agitated overnight, gave haldol 0.5mg PO once.     SUBJECTIVE: Patient seen and examined at bedside. AAOx2-3. Was more drowsy this morning compared to yesterday. Last BM yesterday. Voiding well.     OBJECTIVE:    VITAL SIGNS:  ICU Vital Signs Last 24 Hrs  T(C): 36.6 (2018 05:25), Max: 36.9 (2018 21:00)  T(F): 97.8 (2018 05:25), Max: 98.4 (2018 21:00)  HR: 76 (2018 05:25) (76 - 106)  BP: 146/80 (2018 05:25) (123/79 - 146/80)  BP(mean): --  ABP: --  ABP(mean): --  RR: 18 (2018 05:25) (18 - 20)  SpO2: 99% (2018 05:25) (96% - 99%)         @ 07:  -  -02 @ 07:00  --------------------------------------------------------  IN: 0 mL / OUT: 180 mL / NET: -180 mL     @ 07:01  -  -03 @ 06:29  --------------------------------------------------------  IN: 120 mL / OUT: 110 mL / NET: 10 mL      CAPILLARY BLOOD GLUCOSE    PHYSICAL EXAM:  General: NAD  HEENT: NC/AT; PERRL, clear conjunctiva  Neck: supple  Respiratory: CTA b/l  Cardiovascular: +S1/S2; RRR  Abdomen: soft, NT/ND; +BS x4  Extremities: no LE edema  Skin: RLL non-purulent ulcer/dry skin  Neurological: AAOx2-3, CNii-xii grossly intact, no focal deficits    MEDICATIONS:  MEDICATIONS  (STANDING):  heparin  Injectable 5000 Unit(s) SubCutaneous every 12 hours    MEDICATIONS  (PRN):  acetaminophen   Tablet. 650 milliGRAM(s) Oral every 6 hours PRN Moderate Pain (4 - 6)      ALLERGIES:  Allergies    iodine containing compounds (Unknown)    Intolerances        LABS:                        13.8   5.6   )-----------( 211      ( 2018 06:45 )             40.1     -    140  |  100  |  22  ----------------------------<  114<H>  3.8   |  28  |  0.99    Ca    9.7      2018 06:45  Mg     1.9         TPro  7.5  /  Alb  4.0  /  TBili  0.4  /  DBili  x   /  AST  22  /  ALT  12  /  AlkPhos  80        Urinalysis Basic - ( 2018 14:19 )    Color: Yellow / Appearance: Clear / S.010 / pH: x  Gluc: x / Ketone: NEGATIVE  / Bili: Negative / Urobili: 0.2 E.U./dL   Blood: x / Protein: NEGATIVE mg/dL / Nitrite: NEGATIVE   Leuk Esterase: NEGATIVE / RBC: 5-10 /HPF / WBC < 5 /HPF   Sq Epi: x / Non Sq Epi: x / Bacteria: Present /HPF        RADIOLOGY & ADDITIONAL TESTS: Reviewed.
INTERVAL HPI/OVERNIGHT EVENTS: ROMEO    SUBJECTIVE: Patient seen and examined at bedside. Continues to have hallucination, he was seeing different rooms today morning and wanted visit them. AAOx2-3. Kept talking about natural medicine and denied taking any medication except flexseed. Denied fever, chills, SOB, n/v or diarrhea Last BM yesterday. Voiding well.     OBJECTIVE:    VITAL SIGNS:  ICU Vital Signs Last 24 Hrs  T(C): 36.5 (2018 06:29), Max: 37.1 (2018 14:22)  T(F): 97.7 (2018 06:29), Max: 98.8 (2018 14:22)  HR: 95 (2018 06:29) (80 - 96)  BP: 163/85 (2018 06:29) (145/89 - 177/97)  BP(mean): --  ABP: --  ABP(mean): --  RR: 20 (2018 06:29) (16 - 20)  SpO2: 96% (2018 06:29) (92% - 96%)         @ 07:01  -  -02 @ 07:00  --------------------------------------------------------  IN: 0 mL / OUT: 180 mL / NET: -180 mL      CAPILLARY BLOOD GLUCOSE          PHYSICAL EXAM:    General: NAD  HEENT: dry mucosa  Neck: supple  Respiratory: CTA b/l  Cardiovascular: +S1/S2; 3/6 holosystolic murmur left sternal border  Abdomen: soft, NT/ND; +BS x4  Extremities: WWP, 2+ peripheral pulses b/l; no LE edema  Skin: normal color and turgor; no rash  Neurological: AAOx2, Moves all extremities, ulcer on RLE: dry and non-purulent    MEDICATIONS:  MEDICATIONS  (STANDING):  heparin  Injectable 5000 Unit(s) SubCutaneous every 12 hours    MEDICATIONS  (PRN):  acetaminophen   Tablet. 650 milliGRAM(s) Oral every 6 hours PRN Moderate Pain (4 - 6)      ALLERGIES:  Allergies    iodine containing compounds (Unknown)    Intolerances        LABS:                        13.8   5.6   )-----------( 211      ( 2018 06:45 )             40.1     -    140  |  100  |  22  ----------------------------<  114<H>  3.8   |  28  |  0.99    Ca    9.7      2018 06:45  Mg     1.9         TPro  7.5  /  Alb  4.0  /  TBili  0.4  /  DBili  x   /  AST  22  /  ALT  12  /  AlkPhos  80        Urinalysis Basic - ( 2018 14:19 )    Color: Yellow / Appearance: Clear / S.010 / pH: x  Gluc: x / Ketone: NEGATIVE  / Bili: Negative / Urobili: 0.2 E.U./dL   Blood: x / Protein: NEGATIVE mg/dL / Nitrite: NEGATIVE   Leuk Esterase: NEGATIVE / RBC: 5-10 /HPF / WBC < 5 /HPF   Sq Epi: x / Non Sq Epi: x / Bacteria: Present /HPF        RADIOLOGY & ADDITIONAL TESTS: Reviewed.
Physical Medicine and Rehabilitation Progress Note:    Patient is a 89y old  Male who presents with a chief complaint of Hallucination (2018 17:45)      HPI:  89 yo , domiciled alone male with with h/o chronic venous ulcers, CHF, HTN, failure to thrive with previous admission for fall couple of months ago BIBA after patient's neighbor activated EMS when neighbor heard patient screaming. Patient was complaining of a 4 year old girl wandering in his apartment, which was unfounded by EMS and neighbors. To EMS, patient complained of right ankle and right wrist pain which is chronic but denied falling. When he arrived to ED, he reported he has a "Islam group" who have been contacting him against his wishes as he is [employer]an. Patient lives alone, his wife  20 years ago, has no children. In the ED, vitals 177/97, P96, R18, T97.6, saturating at 92% RA. CBC, CMP, TSH, UA, Utox were unremarkable. CT head unremarkable. Patient admitted for further evaluation. (2018 17:45)                Vital Signs Last 24 Hrs  T(C): 36.6 (2018 08:46), Max: 36.9 (2018 05:14)  T(F): 97.9 (2018 08:46), Max: 98.4 (2018 05:14)  HR: 85 (2018 08:46) (74 - 85)  BP: 132/68 (2018 08:46) (109/67 - 132/68)  BP(mean): --  RR: 20 (2018 08:46) (18 - 20)  SpO2: 96% (2018 08:46) (96% - 98%)    MEDICATIONS  (STANDING):  cholecalciferol 1000 Unit(s) Oral daily  heparin  Injectable 5000 Unit(s) SubCutaneous every 12 hours    MEDICATIONS  (PRN):  acetaminophen   Tablet. 650 milliGRAM(s) Oral every 6 hours PRN Moderate Pain (4 - 6)    Currently Undergoing Physical Therapy at bedside.    Initial Functional Status Assessment:    Social History:  · Marital Status:   poor historian	  · Lives With: alone; poor historian	    Previous Level of Function:     · Ambulation Skills	poor historian	  · Transfer Skills	poor historian	  · ADL Skills	poor historian	  · Work/Leisure Activity	poor historian	  · Additional Comments	Pt unable to state where he lives and his living situation. Pt unable to explain how he ambulated prior to hospital admit.	    Cognitive Status Examination:   · Orientation	person	  · Level of Consciousness	confused	  · Follows Commands and Answers Questions	unable to answer questions; 25% of the time	    Range of Motion Exam:   · Range of Motion Examination	no ROM deficits were identified	    Manual Muscle Testing:   · Manual Muscle Testing Results	no strength deficits were identified	    Bed Mobility: Sit to Supine:     · Level of [place]	supervision	  · Physical Assist/Nonphysical Assist	supervision	    Bed Mobility: Supine to Sit:     · Level of [place]	supervision	  · Physical Assist/Nonphysical Assist	supervision	    Transfer: Sit to Stand:     · Level of [place]	contact guard	  · Physical Assist/Nonphysical Assist	verbal cues; 1 person assist	  · Weight-Bearing Restrictions	full weight-bearing	  · Assistive Device	HHA	    Transfer: Stand to Sit:     · Level of [place]	contact guard	  · Physical Assist/Nonphysical Assist	verbal cues; 1 person assist	  · Weight-Bearing Restrictions	full weight-bearing	  · Assistive Device	HHA	    Sit/Stand Transfer Safety Analysis:     · Transfer Safety Concerns Noted	decreased balance during turns; losing balance	  · Impairments Contributing to Impaired Transfers	impaired balance; cognition	    Transfer: Toilet Transfer:     · Level of [place]	contact guard	  · Physical Assist/Nonphysical Assist	verbal cues; 1 person assist	  · Weight-Bearing Restrictions	full weight-bearing	  · Assistive Device	grab bars; HHA	    Toilet Transfer Safety Analysis:     · Transfer Safety Concerns Noted	decreased balance during turns	  · Impairments Contributing to Impaired Transfers	impaired balance; impaired coordination	    Gait Skills:     · Level of [place]	contact guard; minimum assist (75% patients effort)	  · Physical Assist/Nonphysical Assist	verbal cues; 1 person assist	  · Weight-Bearing Restrictions	full weight-bearing	  · Assistive Device	HHA	  · Gait Distance	25 feet	    Gait Analysis:     · Gait Pattern Used	3-point gait	  · Gait Deviations Noted	decreased jacinda; increased time in double stance; decreased step length; decreased stride length; increased stride width	  · Impairments Contributing to Gait Deviations	impaired balance; cognition; impaired coordination	    Balance Skills Assessment:     · Sitting Balance: Static	fair plus	  · Sitting Balance: Dynamic	fair balance	  · Sit-to-Stand Balance	fair minus	  · Standing Balance: Static	fair minus	  · Standing Balance: Dynamic	fair minus	    Sensory Examination:   Sensory Examination:    Grossly Intact:   · Gross Sensory Examination	Grossly Intact	      Clinical Impressions:   · Criteria for Skilled Therapeutic Interventions	impairments found; rehab potential; therapy frequency; anticipated equipment needs at discharge; anticipated discharge recommendation	  · Impairments Found (describe specific impairments)	aerobic capacity/endurance; gait, locomotion, and balance	  · Rehab Potential	poor; unable to follow commands	            PM&R Impression: as above    Disposition Plan Recommendations: accepted to [facility]
INTERVAL HPI/OVERNIGHT EVENTS: ROMEO    SUBJECTIVE: Patient seen and examined at bedside. AAOx2, oriented him to location today. Reports he has no problems with constipation. Voiding well.     OBJECTIVE:    VITAL SIGNS:  ICU Vital Signs Last 24 Hrs  T(C): 36.7 (04 Jan 2018 06:11), Max: 36.9 (04 Jan 2018 05:14)  T(F): 98 (04 Jan 2018 06:11), Max: 98.4 (04 Jan 2018 05:14)  HR: 74 (04 Jan 2018 06:11) (74 - 82)  BP: 109/67 (04 Jan 2018 06:11) (109/67 - 145/79)  BP(mean): --  ABP: --  ABP(mean): --  RR: 18 (04 Jan 2018 06:11) (18 - 20)  SpO2: 98% (04 Jan 2018 06:11) (96% - 98%)        01-02 @ 07:01  -  01-03 @ 07:00  --------------------------------------------------------  IN: 120 mL / OUT: 110 mL / NET: 10 mL    01-03 @ 07:01  -  01-04 @ 06:36  --------------------------------------------------------  IN: 0 mL / OUT: 125 mL / NET: -125 mL      CAPILLARY BLOOD GLUCOSE    PHYSICAL EXAM:  HEENT: dry mucosa  Neck: supple  Respiratory: CTA b/l  Cardiovascular: +S1/S2; 3/6 holosystolic murmur left sternal border  Abdomen: soft, NT/ND; +BS x4  Extremities: WWP, 2+ peripheral pulses b/l; no LE edema  Skin: ulcer on RLE, dry and non-purulent  Neurological: AAOx2, Moves all extremities,     MEDICATIONS:  MEDICATIONS  (STANDING):  cholecalciferol 1000 Unit(s) Oral daily  heparin  Injectable 5000 Unit(s) SubCutaneous every 12 hours    MEDICATIONS  (PRN):  acetaminophen   Tablet. 650 milliGRAM(s) Oral every 6 hours PRN Moderate Pain (4 - 6)      ALLERGIES:  Allergies    iodine containing compounds (Unknown)    Intolerances        LABS:                        13.8   5.6   )-----------( 211      ( 02 Jan 2018 06:45 )             40.1     01-02    140  |  100  |  22  ----------------------------<  114<H>  3.8   |  28  |  0.99    Ca    9.7      02 Jan 2018 06:45  Mg     1.9     01-02    RADIOLOGY & ADDITIONAL TESTS: Reviewed.

## 2018-01-04 NOTE — PROGRESS NOTE ADULT - PROBLEM SELECTOR PLAN 6
-normocytic anemia on cbc  -consider iron studies
-normocytic anemia on cbc  -consider iron studies
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

## 2018-01-04 NOTE — DISCHARGE NOTE ADULT - PLAN OF CARE
Safety You came to the hospital with hallucination. There was no infectious etiology, electrolyte abnormality or metabolic derangement to explain your behavior. You likely have mild cognitive impairment. You are being discharged to rehab given impaired balance. You have history of chronic CHF which is stable without medications. Please seen a doctor if you have CHF exacerbation. You have chronic lower extremity dry, scaly skin, please apply AtracTain cream twice daily. You also have right lower extremity medial malleolus scab, please apply hydrogel, Telfa and Kerlix every other day.

## 2018-01-04 NOTE — PROGRESS NOTE ADULT - PROBLEM SELECTOR PLAN 7
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
DVT: heparin  GI: no indication

## 2018-01-04 NOTE — DISCHARGE NOTE ADULT - SECONDARY DIAGNOSIS.
Chronic systolic (congestive) heart failure Chronic ulcer of lower extremity, right, with unspecified severity

## 2018-01-04 NOTE — PROGRESS NOTE ADULT - PROBLEM SELECTOR PLAN 5
-LV EF 35% on echo in 1/2017  -patient doesn't want to take medication because he believes in natural medicine  -clinically stable currently, consider BB and/or ACEI if elevated HR or BP  -no signs of fluid overload

## 2018-01-04 NOTE — DISCHARGE NOTE ADULT - PATIENT PORTAL LINK FT
“You can access the Follow[employer] Patient Portal, offered by [facility], by registering with the following website: [email or web address]”

## 2018-01-04 NOTE — PROGRESS NOTE ADULT - ASSESSMENT
89M with h/o OA BIBA after having episode of hallucination/screaming in his apartment.
89M with h/o OA BIBA after having episode of hallucination/screaming in his apartment.
89M with h/o OA BIBA after having episode of hallucination/screaming in his apartment.    Problem/Plan - 1:  ·  Problem: Hallucinations.  Plan: -patient presenting with hallucination  -CT head unremarkable  -Low suspicion for infectious etiology   -most likely has dementia, DDx: Dementia lewy bodies vs Mixed dementia vs Alziemer's disease   -low suspicion for delirium   -CXR: unremarkable, RPR: neg, b12/folate: wnl  -TSH wnl  -Utox neg, CT head neg, no electrolyte abnormality  -f/u Psychiatry recs  -will offer Trazodone 25mg po q8hr prn if patient has anxiety or insomnia  -will offer Haldol 0.25-0.5mg po q 8 hrs if patient is agitated  -offer eyeglasses  -serial EKGs.
89M with h/o OA BIBA after having episode of hallucination/screaming in his apartment.

## 2018-01-04 NOTE — PROGRESS NOTE ADULT - PROBLEM SELECTOR PLAN 1
-patient presenting with hallucination  -CT head unremarkable  -Low suspicion for infectious etiology   -most likely has dementia, DDx: Dementia lewy bodies vs Mixed dementia vs Alziemer's disease   -low suspicion for delirium   -CXR: unremarkable, RPR: neg, b12/folate: wnl  -TSH wnl  -Utox neg, CT head neg, no electrolyte abnormality  -f/u Psychiatry recs  -will offer Trazodone 25mg po q8hr prn if patient has anxiety or insomnia  -will offer Haldol 0.25-0.5mg po q 8 hrs if patient is agitated  -offer eyeglasses  -serial EKGs -patient presenting with hallucination  -CT head unremarkable  -Low suspicion for infectious etiology   -most likely has mild cognitive impairment, DDx: Dementia lewy bodies vs Mixed dementia vs Alziemer's disease   -low suspicion for delirium   -CXR: unremarkable, RPR: neg, b12/folate: wnl  -TSH wnl  -Utox neg, CT head neg, no electrolyte abnormality  -f/u Psychiatry recs  -will offer Trazodone 25mg po q8hr prn if patient has anxiety or insomnia  -will offer Haldol 0.25-0.5mg po q 8 hrs if patient is agitated  -offer eyeglasses  -serial EKGs

## 2018-01-04 NOTE — PROGRESS NOTE ADULT - PROBLEM SELECTOR PROBLEM 4
Chronic ulcer of lower extremity, right, with unspecified severity
CHF, left ventricular
Chronic ulcer of lower extremity, right, with unspecified severity

## 2018-01-04 NOTE — DISCHARGE NOTE ADULT - MEDICATION SUMMARY - MEDICATIONS TO TAKE
I will START or STAY ON the medications listed below when I get home from the hospital:    acetaminophen 325 mg oral tablet  -- 2 tab(s) by mouth every 6 hours, As needed, Moderate Pain (4 - 6)  -- Indication: For Osteoarthritis    cholecalciferol oral tablet  -- 1000 unit(s) by mouth once a day  -- Indication: For Vit D deficiency

## 2018-01-04 NOTE — PROGRESS NOTE ADULT - PROBLEM SELECTOR PLAN 9
F: none  E: replete as needed  N: DASH/TLC diet
-PT evaluation  -SW evaluation  -consider Ethics given patient has no capacity to make decision and has hx of refusing every medical intervention
F: none  E: replete as needed  N: DASH/TLC diet

## 2018-01-08 DIAGNOSIS — M19.91 PRIMARY OSTEOARTHRITIS, UNSPECIFIED SITE: ICD-10-CM

## 2018-01-08 DIAGNOSIS — L97.919 NON-PRESSURE CHRONIC ULCER OF UNSPECIFIED PART OF RIGHT LOWER LEG WITH UNSPECIFIED SEVERITY: ICD-10-CM

## 2018-01-08 DIAGNOSIS — R44.1 VISUAL HALLUCINATIONS: ICD-10-CM

## 2018-01-08 DIAGNOSIS — I11.0 HYPERTENSIVE HEART DISEASE WITH HEART FAILURE: ICD-10-CM

## 2018-01-08 DIAGNOSIS — I87.2 VENOUS INSUFFICIENCY (CHRONIC) (PERIPHERAL): ICD-10-CM

## 2018-01-08 DIAGNOSIS — Z95.2 PRESENCE OF PROSTHETIC HEART VALVE: ICD-10-CM

## 2018-01-08 DIAGNOSIS — D64.9 ANEMIA, UNSPECIFIED: ICD-10-CM

## 2018-01-08 DIAGNOSIS — G31.84 MILD COGNITIVE IMPAIRMENT OF UNCERTAIN OR UNKNOWN ETIOLOGY: ICD-10-CM

## 2018-01-08 DIAGNOSIS — I50.22 CHRONIC SYSTOLIC (CONGESTIVE) HEART FAILURE: ICD-10-CM

## 2018-05-19 NOTE — PROGRESS NOTE BEHAVIORAL HEALTH - GROOMING
[FreeTextEntry8] : One day of right eye conjunctivitis with thick crust positive preauricular node. Not painful. Vision okay seen in walk-in yesterday given ofloxacin. Has taken one dose here today because she wants Maxitrol. I explained that Maxitrol has steroid in it and is used only by ophthalmologist\par \par Cornea appears normal with ophthalmoscope
Poor
Poor

## 2018-06-25 NOTE — PROGRESS NOTE ADULT - PROBLEM SELECTOR PLAN 2
Patient: Raul Welch    Procedure Summary     Date:  06/25/18 Room / Location:  [facility] ENDOSCOPY 2 / [facility] ENDOSCOPY    Anesthesia Start:  1143 Anesthesia Stop:      Procedure:  COLONOSCOPY with hot snare polypectomy,  cold snare polypectomy, cold biopsy polypectomies, and biopsies (N/A Anus) Diagnosis:       Diverticulosis      Colon polyps      Internal hemorrhoid      (Diarrhea [R19.7])      (LUQ pain [R10.12])    Surgeon:  Sandro Potter MD Provider:  Anil Kinsey CRNA    Anesthesia Type:  MAC ASA Status:  3          Anesthesia Type: MAC  Last vitals  BP   127/75 (06/25/18 0945)   Temp   97.9 °F (36.6 °C) (06/25/18 0945)   Pulse   70 (06/25/18 0945)   Resp   21 (06/25/18 0945)     SpO2   97 % (06/25/18 0945)     Post Anesthesia Care and Evaluation    Patient location during evaluation: PHASE II  Patient participation: complete - patient participated  Level of consciousness: awake  Pain score: 0  Pain management: adequate  Airway patency: patent  Anesthetic complications: No anesthetic complications  PONV Status: none  Cardiovascular status: acceptable  Respiratory status: acceptable and nasal cannula  Hydration status: acceptable    Comments: vsss resp spont, reflexes intact, responsive, report given to pacu nurse       Patient without elevated WBC, leukocytosis, fever, or hypotension; suspicion for infection is low at this time; patient mentating well.   S/p 1x of vancomycin and zosyn in ER. +pulses present bilateral lower extremities. Likely chronic venous stasis ulcer consistent with chronic periosteal reaction seen on X-ray of right ankle.    -Wound Care consult    -Podiatry consult in AM, compression and leg elevation  - Would hold off on antibiotics for now though patient refusing; ESR/CRP elevated  -MRI for evaluation for further evaluation Patient without elevated WBC, leukocytosis, fever, or hypotension; suspicion for infection is low at this time; patient mentating well.   S/p 1x of vancomycin and zosyn in ER. +pulses present bilateral lower extremities. Likely chronic venous stasis ulcer consistent with chronic periosteal reaction seen on X-ray of right ankle.    -Wound Care consult    -Podiatry called, will see patient today (tried yesterday but was at imaging studies)  - Would hold off on antibiotics for now though patient refusing; ESR/CRP elevated Patient without elevated WBC, leukocytosis, fever, or hypotension; suspicion for infection is low at this time; patient mentating well.   S/p 1x of vancomycin and zosyn in ER. +pulses present bilateral lower extremities. Likely chronic venous stasis ulcer consistent with chronic periosteal reaction seen on X-ray of right ankle.    -Wound Care consult    -Podiatry: low suspicion for infectious osteomyelitis; agree with wound care/ wound care RN recommendations.

## 2019-01-07 NOTE — CONSULT NOTE ADULT - ASSESSMENT
Admitted for:  Problem/Plan - 1:  ·  Problem: Fall.  Plan: Patient presents with fall, likely mechanical as patient with sig. r.knee osteoarthritis and frequently leans on left leg; patient with unkempt appearance and found to have maggots in ulcer on arrival. No sob, chest pain, neurological sequelae suggesting cardiac event, or seizure like activity. Patient likely with chronic deconditioning and has not seen physician in several years. EKG showed 1st degree block with Atrial Premature contractions.    -F/U social work   -Patient without Physician for care management, will likely need to be plugged into [facility] for follow upPatient presents with fall, likely mechanical as patient with sig. r.knee osteoarthritis and frequently leans on left leg; patient with unkempt appearance and found to have maggots in ulcer on arrival. No sob, chest pain, neurological sequelae suggesting cardiac event, or seizure like activity. Patient likely with chronic deconditioning and has not seen physician in several years. Patient agreeing to be admitted because he does not have a way to go home currently.   -Will add-on  CK as patient on floor for hours per him and will start on gentle hydration with NS at 80cc/hr  -PT/OT consult in AM  -F/U social work for placement  -Patient without Physician for care management, will likely need to be plugged into [facility] for follow up  -check EKG (patient refused during night, will try again this AM).     Problem/Plan - 2:  ·  Problem: Skin ulcer of right ankle with fat layer exposed.  Plan: Patient without elevated WBC, leukocytosis, fever, or hypotension; suspicion for infection is low at this time; patient mentating well.   S/p 1x of vancomycin and zosyn in ER. +pulses present bilateral lower extremities. Likely chronic venous stasis ulcer consistent with chronic periosteal reaction seen on X-ray of right ankle.    -Wound Care consult    -Podiatry called, will see patient today (tried yesterday but was at imaging studies)  - Would hold off on antibiotics for now though patient refusing; ESR/CRP elevatedPatient without elevated WBC, leukocytosis, fever, or hypotension; suspicion for infection is low at this time; patient mentating well.   S/p 1x of vancomycin and zosyn in ER. +pulses present bilateral lower extremities. Likely chronic venous stasis ulcer consistent with chronic periosteal reaction seen on X-ray of right ankle.    -Wound Care consult    -Podiatry consult in AM, compression and leg elevation  - Would hold off on antibiotics for now though patient refusing; ESR/CRP elevated  -MRI for evaluation for further evaluation. No muscle or joint tenderness

## 2019-03-07 NOTE — PATIENT PROFILE ADULT. - PURPOSEFUL PROACTIVE ROUNDING
PPD Reading Note  PPD read and results entered in [address]. Result: 0 mm induration.   Interpretation: 0  If test not read within 48-72 hours of initial placement, patient advised to repeat in other arm 1-3 weeks after this test.  Allergic reaction: no    Chief Complaint   Patient presents with    PPD Reading
Patient

## 2020-01-28 NOTE — DISCHARGE NOTE ADULT - NS MD DC FALL RISK RISK
Afib    Anemia    BPH (benign prostatic hyperplasia)    CAD (coronary artery disease)  CARD STENT X2 4/2018  Cardiomyopathy    CHF (congestive heart failure)  COMBINED SYSTOLIC & DIASTOLIC  Cholelithiasis    Dementia    Diabetes    DM (diabetes mellitus)    GERD (gastroesophageal reflux disease)  intermittent  Glaucoma    H/O ptosis of eyelid  right eye (sometimes wears patch)  Heart attack  1/2018  Hematuria    HTN (hypertension)    Hydrocele in adult    Hypercholesteremia    Hyperlipidemia    Renal insufficiency    Sepsis  1/18 FROM INFECTED GALLBLADDER  TIA (transient ischemic attack)  X2 JULY 2018
For information on Fall & Injury Prevention, visit [email or web address]

## 2020-11-27 NOTE — ED PROVIDER NOTE - MUSCULOSKELETAL, MLM
71
Spine appears normal, range of motion is not limited, no midline ttp throughout the spine. + R knee swelling, RLE internally rotated, chronic a/p pt. No ecchymosis / warmth or abrasions. pelvis stable. no knee ttp. + mild ttp w/ ROM R knee. chronic R medial ankle ulcer / scabbed

## 2021-10-03 NOTE — PATIENT PROFILE ADULT. - MEDICATIONS BROUGHT TO HOSPITAL, PROFILE
-- [employer] BLUE [place] / Plan: BLUE [id number] / Avril Knowles (Self) [phone] (H)          Alternative phone number: [phone]    Turnaround time given to caller:   \"This message will be sent to [state Provider's full name]. The clinical team will return your call as soon as they review your message. Typically, it takes 3 business days to process referral requests.\"   no

## 2021-10-04 NOTE — PROGRESS NOTE BEHAVIORAL HEALTH - NS ED BHA MED ROS RESPIRATORY
Follow up with opthalmology, you have an appointment scheduled for tomorrow morning at 9am  [facility] Ophthalmology Department on Tuesday at 9am  [address] [facility], [address] [place] [address]  Continue taking Clindamycin 300mg (1 tablet) 3 times a day  Continue taking Augmentin 875mg (1 tablet)  2 times a day  Warm compress to left eye for 15 minutes at least 4 times a day    For Diabetes:  Follow up with endocrine in clinic within 2-3 weeks, office information listed above please call to make an appointment   - Take Lantus 18 units at bedtimes  - Take Humalog 6 units three times a day with meals  Keep a log of your fingersticks 3-4 times a day    Return to the ER with any worsening or concerning symptoms, increased swelling/redness to area, fever/chills, nausea, vomiting, weakness, changes to your vision or any other concerns.
No complaints
No complaints

## 2022-02-03 NOTE — H&P ADULT. - NEUROLOGICAL DETAILS
cranial nerves intact/alert and oriented x 3/responds to pain/sensation intact/responds to verbal commands Tranexamic Acid Pregnancy And Lactation Text: It is unknown if this medication is safe during pregnancy or breast feeding.

## 2022-02-28 NOTE — DIETITIAN INITIAL EVALUATION ADULT. - ETIOLOGY
"  [address] [address]  [place]   [place] [address]  Phone: [phone]  Fax: [phone]      SLEEP CLINIC FOLLOW UP PROGRESS NOTE.    Jung Burkett  1964  58 y.o.  male      PCP: Nicola Schrader PA      Date of visit: 2/28/2022    No chief complaint on file.      HPI:  This is a 58 y.o. years old patient who has a history of idiopathic hypersomnia and ADHD who is on Adderall 20 mg 3 times a day and doing well.  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  He says the medication has helped him and is able to function.  In the last 6 months patient is not able to work because of his shoulder problem.  Recently also has developed palpitation for which he is started on beta-blocker and he had an echocardiogram which came out as normal and also Holter monitor.      Medications and allergies are reviewed by me and documented in the encounter.     SOCIAL ( habits pertaining to sleep medicine)  History of tobacco use:Yes smoke cigars  History of alcohol use: 6 per week  Caffeine use: 1    REVIEW OF SYSTEMS:   [place] Sleepiness Scale :Total score: 13   Nasal congestion:No   Dry mouth/nose:No   Post nasal drip; No   Acid reflux/Heartburn:Yes   Abd bloating:No   Morning headache:No   Anxiety:No   Depression:No     PHYSICAL EXAMINATION:  CONSTITUTIONAL:  Vitals:    02/28/22 1500   BP: 118/86   Pulse: 81   Temp: 97.2 °F (36.2 °C)   SpO2: 97%   Weight: 81.6 kg (180 lb)   Height: 172.7 cm (68\")    Body mass index is 27.37 kg/m².   NOSE: nasal passages are clear, no nasal polyps, septum in the midline.  THROAT: throat is clear, oral airway Mallampati class 2  RESP SYSTEM: Breath sounds are normal, no wheezes or crackles  CARDIOVASULAR: Heart rate is regular without murmur. No edema      Celestine checked no problem    ASSESSMENT AND PLAN:  · Idiopathic hypersomnia.  Patient is unstable on Adderall 20 mg 3 " times a day and I have renewed his medications and will see him in 6 months for follow-up.  Celestine has been checked on PDMP and has no problems.  He gets his medications from [facility]  · ADHD,    · Palpitation, on beta-blocker  · Return in about 6 months (around 8/28/2022) for Next scheduled follow-up. . Patient's questions were answered.        Jaimie Kamara MD  Sleep Medicine  Medical Director, [facility] sleep [facility]  2/28/2022    r/t geriatric pt with BMI <23.0; 91% IBW r/t geriatric pt with BMI <23.0; 91% IBW; chronic ulcer

## 2022-04-07 NOTE — PHYSICAL THERAPY INITIAL EVALUATION ADULT - LEVEL OF INDEPENDENCE: STAIR NEGOTIATION, REHAB EVAL
Left chemoport placed with ultrasound guidance and fluoroscopic confirmation. Access site closed with suture and dermabond. See full dictation. not assessed this initial evaluation due to decreased safety awareness exhibited with ambulation

## 2022-04-13 NOTE — ED ADULT NURSE REASSESSMENT NOTE - NS ED NURSE REASSESS COMMENT FT1
We are committed to providing you the best care possible. If you receive a survey after visiting one of our offices, please take time to share your experience concerning your physician office visit. These surveys are confidential and no health information about you is shared. We are eager to improve for you and we are counting on your feedback to help make that happen.
 Alessandro called to talk with patient and evaluate home situation.
Patient seen by CHRIS AWAN Case Manager.  Psychiatry currently at bedside evaluating.  Continues to report seeing "3 year old girl."  Able to recall my name, "Samreen."  Multiple times throughout ER visit.  Short term recall 3/3.  Recalls stories of teenage and 20s, "My wife passed about 20 years ago."  "I never went to war because I had an accident on my foot."  Pending psych recommendations.

## 2022-06-03 NOTE — DISCHARGE NOTE ADULT - MEDICATION SUMMARY - MEDICATIONS TO TAKE
[Subsequent Evaluation] : a subsequent evaluation for [FreeTextEntry2] : enlarged parotid gland I will START or STAY ON the medications listed below when I get home from the hospital:    walker   -- Apply on skin to affected area once a day    dispense 1 rolling walker     ht: 5'9"  wt 155 lbs  -- Indication: For Osteoarthritis

## 2022-08-09 NOTE — DISCHARGE NOTE ADULT - DISCHARGE DATE
27-Jan-2017 Xolair Pregnancy And Lactation Text: This medication is Pregnancy Category B and is considered safe during pregnancy. This medication is excreted in breast milk.

## 2022-12-07 NOTE — ED PROVIDER NOTE - CARDIAC, MLM
Normal rate, regular rhythm.  Heart sounds S1, S2.  No murmurs, rubs or gallops. Olumiant Pregnancy And Lactation Text: Based on animal studies, Olumiant may cause embryo-fetal harm when administered to pregnant women.  The medication should not be used in pregnancy.  Breastfeeding is not recommended during treatment.

## 2023-03-29 NOTE — H&P ADULT - ATTENDING COMMENTS
jaime Pt seen and examined at bedside on 1/1/2017 @ 2100    Agree with HPI, ROS as above.     VS, Labs, FH, SH, allergies, medications, imaging reviewed. I personally reviewed the EKG - q waves in V1 - V2, NSR, apc.. I reviewed the old records - previous admissions for falls - has always refused medical care and most interventions. Agree with physical exam as above    A/P: 89M with h/o OA BIBA after having episode of hallucination/screaming in his apartment.    **Toxic Metabolic Encephalopathy  -Based on previous reported history/admissions likely worsening of dementia  -Given age and visual hallucinations possible DLB?  -No evidence of infectious etiology at this time  -Check RPR, b12, vitamin D level  -TSH wnl  -UDS wnl  -CT head with no acute intracranial pathology  -Psychiatry consulted in ED, appreciate further recs  -Consideration for neuro consult in AM - as patient may benefit from cholinesterase inhibitors/memantine    **HTN  -Pt hypertensive in ED, not taking any home medications if continues to be hypertensive will start ace-i given history of CHF and or betablocker  -Goal <150/90  -Will need to be titrated carefully as possibility of autonomic dysfunction given possible dementia    **Mitral Valve replacement  -Pt not on a/c likely due to multiple falls in the past and non compliance  -Will obtain more collateral in AM but likely risks outweigh benefits in starting A/C    **GOC  -Given previous documentation patient seems to want limited medical intervention. Will have psychiatry assess capacity of patient to make decisions regarding specific interventions i.e. rescucitation/intubation.     Plan otherwise as outlined above....

## 2024-09-12 NOTE — PATIENT PROFILE ADULT. - NSSUBSTANCEUSE_GEN_ALL_CORE_SD
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     Is this a new or return visit: New    Have you been treated for this in the past? Not this toe, but a similar issue     Additional comments: Pt is being referred by Primary care    Could we send this information to you in [facility] or would you prefer to receive a phone call?:   Patient would prefer a phone call   Okay to leave a detailed message?: Yes at Cell number on file:    Telephone Information:   Mobile [phone]       
never used